# Patient Record
Sex: MALE | Race: WHITE | NOT HISPANIC OR LATINO | Employment: FULL TIME | URBAN - METROPOLITAN AREA
[De-identification: names, ages, dates, MRNs, and addresses within clinical notes are randomized per-mention and may not be internally consistent; named-entity substitution may affect disease eponyms.]

---

## 2022-08-23 ENCOUNTER — MEDICAL CORRESPONDENCE (OUTPATIENT)
Dept: HEALTH INFORMATION MANAGEMENT | Facility: CLINIC | Age: 42
End: 2022-08-23

## 2022-08-25 ENCOUNTER — TRANSCRIBE ORDERS (OUTPATIENT)
Dept: OTHER | Age: 42
End: 2022-08-25

## 2022-08-25 DIAGNOSIS — R42 DIZZINESS: ICD-10-CM

## 2022-08-25 DIAGNOSIS — R40.4 EPISODIC ALTERED AWARENESS: Primary | ICD-10-CM

## 2022-08-25 DIAGNOSIS — R41.3 EPISODE OF MEMORY LOSS: ICD-10-CM

## 2022-11-11 NOTE — TELEPHONE ENCOUNTER
RECORDS RECEIVED FROM: Internal   REASON FOR VISIT: Episodic altered awareness [R40.4]  Episode of memory loss [R41.3]  Dizziness   Date of Appt: 02/02/2023   NOTES (FOR ALL VISITS) STATUS DETAILS   OFFICE NOTE from referring provider Care Everywhere 08/08/2022 [facility]    OFFICE NOTE from other specialist N/A    DISCHARGE SUMMARY from hospital N/A    DISCHARGE REPORT from the ER N/A    OPERATIVE REPORT N/A    MEDICATION LIST N/A    IMAGING  (FOR ALL VISITS)     EMG N/A    EEG N/A    LUMBAR PUNCTURE N/A    AVA SCAN N/A    ULTRASOUND (CAROTID BILAT) *VASCULAR* N/A    MRI (HEAD, NECK, SPINE) Received 06/27/2022 head     CT (HEAD, NECK, SPINE) Received 08/01/2022 head, CTA head neck  06/13/2022 cervical spine, head      Images in PACS

## 2023-02-02 ENCOUNTER — PRE VISIT (OUTPATIENT)
Dept: NEUROLOGY | Facility: CLINIC | Age: 43
End: 2023-02-02

## 2023-02-02 ENCOUNTER — OFFICE VISIT (OUTPATIENT)
Dept: NEUROLOGY | Facility: CLINIC | Age: 43
End: 2023-02-02
Attending: NURSE PRACTITIONER
Payer: COMMERCIAL

## 2023-02-02 VITALS
HEART RATE: 74 BPM | WEIGHT: 285 LBS | SYSTOLIC BLOOD PRESSURE: 127 MMHG | HEIGHT: 77 IN | DIASTOLIC BLOOD PRESSURE: 81 MMHG | BODY MASS INDEX: 33.65 KG/M2

## 2023-02-02 DIAGNOSIS — R41.89 BRAIN FOG: ICD-10-CM

## 2023-02-02 DIAGNOSIS — R40.4 TRANSIENT ALTERATION OF AWARENESS: ICD-10-CM

## 2023-02-02 DIAGNOSIS — G43.011 INTRACTABLE MIGRAINE WITHOUT AURA AND WITH STATUS MIGRAINOSUS: Primary | ICD-10-CM

## 2023-02-02 DIAGNOSIS — G44.83 COUGH HEADACHE: ICD-10-CM

## 2023-02-02 PROCEDURE — 99205 OFFICE O/P NEW HI 60 MIN: CPT | Performed by: STUDENT IN AN ORGANIZED HEALTH CARE EDUCATION/TRAINING PROGRAM

## 2023-02-02 RX ORDER — PREGABALIN 75 MG/1
1 CAPSULE ORAL 2 TIMES DAILY
COMMUNITY
Start: 2022-12-28 | End: 2023-09-18

## 2023-02-02 RX ORDER — SUMATRIPTAN 50 MG/1
50 TABLET, FILM COATED ORAL
Qty: 9 TABLET | Refills: 4 | Status: SHIPPED | OUTPATIENT
Start: 2023-02-02 | End: 2023-06-08

## 2023-02-02 RX ORDER — MELATONIN 10 MG
1 CAPSULE ORAL AT BEDTIME
COMMUNITY

## 2023-02-02 RX ORDER — METHYLPREDNISOLONE 4 MG
TABLET, DOSE PACK ORAL
Qty: 21 TABLET | Refills: 0 | Status: SHIPPED | OUTPATIENT
Start: 2023-02-02 | End: 2023-06-08

## 2023-02-02 RX ORDER — CYCLOBENZAPRINE HCL 10 MG
10 TABLET ORAL 2 TIMES DAILY PRN
Status: ON HOLD | COMMUNITY
Start: 2022-11-25 | End: 2023-06-12

## 2023-02-02 RX ORDER — TOPIRAMATE 25 MG/1
25 TABLET, FILM COATED ORAL 2 TIMES DAILY
Qty: 28 TABLET | Refills: 0 | Status: SHIPPED | OUTPATIENT
Start: 2023-02-02 | End: 2023-03-22

## 2023-02-02 RX ORDER — NICOTINE POLACRILEX 4 MG/1
20 GUM, CHEWING ORAL EVERY MORNING
COMMUNITY
End: 2023-06-08

## 2023-02-02 RX ORDER — TOPIRAMATE 50 MG/1
50 TABLET, FILM COATED ORAL 2 TIMES DAILY
Qty: 60 TABLET | Refills: 3 | Status: SHIPPED | OUTPATIENT
Start: 2023-02-02 | End: 2023-03-22

## 2023-02-02 ASSESSMENT — PAIN SCALES - GENERAL: PAINLEVEL: SEVERE PAIN (7)

## 2023-02-02 NOTE — PATIENT INSTRUCTIONS
Magnesium oxide 400 mg Riboflavin (vitamin b2) 400 mg daily    S.A.D. light, + melatonin (3 mg) are good ideas    Topamax    --Start Topamax 25 mg twice a day x2 week then go up to 50 mg in AM and 50 mg at night x2 weeks then reach out if tolerating can go higher   Most people tolerate topamax well.  We discussed these side effects/considerations including but not limited to: to stay well hydrated (kidney stones a possible side effect), peripheral tingling, brain fog. funny taste, weight loss, rarely glaucoma.  It is also pregnancy category D, and can lead to birth control pills not working. (at high doses, specifically estrogen component).    My next option:  nortriptyline    Imitrex as needed
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
## 2023-02-12 ENCOUNTER — HEALTH MAINTENANCE LETTER (OUTPATIENT)
Age: 43
End: 2023-02-12

## 2023-02-16 ENCOUNTER — TELEPHONE (OUTPATIENT)
Dept: NEUROPSYCHOLOGY | Facility: CLINIC | Age: 43
End: 2023-02-16

## 2023-02-17 NOTE — TELEPHONE ENCOUNTER
[facility] Call Center    Phone Message    May a detailed message be left on voicemail: yes     Reason for Call: Other: Patient was returning a call regarding scheduling. Please call back to discuss.      Action Taken: Other: Neuropsychology     Travel Screening: Not Applicable

## 2023-03-22 DIAGNOSIS — G43.011 INTRACTABLE MIGRAINE WITHOUT AURA AND WITH STATUS MIGRAINOSUS: Primary | ICD-10-CM

## 2023-03-22 RX ORDER — NORTRIPTYLINE HCL 25 MG
25 CAPSULE ORAL AT BEDTIME
Qty: 90 CAPSULE | Refills: 1 | Status: SHIPPED | OUTPATIENT
Start: 2023-03-22 | End: 2024-01-31

## 2023-04-04 NOTE — PROGRESS NOTES
[facility]/[place]  Section of General Neurology  Return Patient  Virtual Visit    Casey Cooper MRN# [id number]   Age: 43 year old YOB: 1980            Assessment and Plan:   Casey Cooper is a pleasant 42 year old man seen in follow up today.  As noted previously has had an insidious worsening over the last year or so with worsening headaches, brain fog and spells for several hours and have amnesia to these events, without clear seizure like activity.  He has been thoroughly worked up in this regard as below to no avail.  It had been speculated that perhaps a chiari was related by some.  To my review his cerebellar tonsil is a tad low but not clearly representative of a symptomatic chiari.  We decided to trial further migrainous type of treatments to see what could be migrainous vs not.  topiramate was helpful but difficult to tolerate.  We switched to nortriptyline in between visits.  Overall his symptoms persist and notably he is getting pressure in his head with neck flexion and episodic incoordination/dizziness where I think we should get further opinion regarding his low hanging cerebellar tonsil as below as symptoms are suspicious that this could be related given how debilitated he is.         --He will decrease lyrica to twice a day and see how he does as unclear how much this is helping  --Increase nortriptyline to 50 mg nightly  --Change imitrex as needed to maxalt as needed for bad migraine days  --Neuropsychological testing previously ordered though he recently completed this closer to home  --Neurosurgery referral to discuss low hanging cerebellar tonsil as it applies to his symptoms, risk/benefit of a potential surgery   --Follow up with me in ~4 months for video visit, reach out sooner with issues questions or changes        Levy Miller MD   of Neurology   [facility]      Interval history:   Had reaction to imitrex--Makes the  "top of head hot but it did work.  Discussed other options.   Steroids--No real change  Topamax--was making him sick, \"dopamax\" type of side effect.  Stopped this.  Intensified hand/feet symptoms.  He suspects it was helping the headaches.    Nortriptyline--no side effects.  Still having headaches.    Neuropsychological testing --scheduled for August, was seen in [facility] in this regard  Has upcoming sleep study.   Notes he always slept poorly.  Balance/dizziness: Still has problems with this.  Still can't really look down, gets pressure in head.    Incoordination--Still present episodically  When headaches were better on topiramate this was perhaps slightly better, not appreciably.    Is still not working.    Is applying for social security disability.      A/P at last visit  Casey Cooper is a pleasant 42 year old man seen in consultation today.  He has had an insidious worsening over the last 8 months with worsening headaches, brain fog and spells where he can not be like himself for several hours and have amnesia to these events, without clear seizure like activity.  He has been thoroughly worked up in this regard as below to no avail.  It had been speculated that perhaps a chiari was related by some.  To my review his cerebellar tonsil is a tad low but not clearly representative of a symptomatic chiari but if symptoms continue without clear resolution I think we could re image in this regard and explore this as an option given other factors noted (incoordination, not clearly present on today's exam).   There are plans for repeating EEG data in [facility] currently as well and should continue to explore these episodes with this neurology team as well. He has tried treatment for primary cough headache with indomethicin with mixed results.   His headache do have some migrainous features as well and discussed that optimizing from this angle could improve brain fog/improve quality of life.  The spells seem related to " headache so perhaps they will improve with improved headache health and improved sleep as well.    Regarding brain fog: Discussed optimizing headaches would be important as is improving sleep, mood if a factor (poor sleep seems to be the biggest feature here).  Will obtain neuropsychological testing to further examine this.  Overall his work up has been throughout without clear explanation.  Will try to chip away at his symptoms and see what we can improve.        Plan:  --Magnesium oxide 400 mg Riboflavin (vitamin b2) 400 mg daily  --S.A.D. light, + melatonin (3 mg) are good ideas for sleep, other sleep hygiene also discussed  --Topiramate to up titrate to 50 mg BID and can increase farther from there pending toleration, side effects discussed  --Next option if this does not help:  Nortriptyline would likely be my recommendation  --Imitrex 50 mg as needed for bad headaches  --Medrol dose pack to try to break headache cycle further, has responded well to steroids in the past  --Neuropsychological testing ordered  --If no improvement with treating as migraine could consider repeating MRI/CINE imaging vs further neurosurgical opinion to better understand significance of low cerebellar tonsil given ambiguity of presentation/some symptoms that sound chiari-like at times.    --Follow up in 2-3 months       Medications:     Current Outpatient Medications   Medication Sig     cyclobenzaprine (FLEXERIL) 10 MG tablet as needed     Melatonin 10 MG CAPS Take by mouth At Bedtime     methylPREDNISolone (MEDROL DOSEPAK) 4 MG tablet therapy pack Follow Package Directions     nortriptyline (PAMELOR) 25 MG capsule Take 1 capsule (25 mg) by mouth At Bedtime     omeprazole 20 MG tablet Take 20 mg by mouth daily     pregabalin (LYRICA) 75 MG capsule Take 1 capsule by mouth 3 times daily     SUMAtriptan (IMITREX) 50 MG tablet Take 1 tablet (50 mg) by mouth at onset of headache for migraine May repeat in 2 hours. Max 4 tablets/24 hours.      No current facility-administered medications for this visit.        Allergies:   No Known Allergies       Physical Exam:   General: Seated comfortably in no acute distress.  Neurologic:     Mental Status: Fully alert, attentive and oriented. Speech clear and fluent, no paraphasic errors.     Cranial Nerves: EOM appear intact. Facial movements symmetric. Hearing not formally tested but intact to conversation.  No dysarthria.     Motor: No tremors or other abnormal movements observed.      Sensory:Not able to be tested virtually     Coordination: Not tested     Gait: Not tested         Data: Pertinent prior to visit   Imaging: CTA H/N 2022  IMPRESSION:   1. No hemodynamically significant intracranial stenosis.   2. The vertebral and internal carotid arteries are patent.   3. The previously identified left cavernous ICA aneurysm is difficult to   Visualize.     I personally reviewed the above imaging and agree with the findings in the report.  Essentially normal/unrevealing          MRI brain 2022  IMPRESSION:   1. Negative brain MRI     Normal brain, slightly low hanging cerebellar tonsil, unclear if meaningful.      MRI c spine 2022     IMPRESSION:   1. No high-grade canal stenosis, cervical cord abnormality, or focal disc bulge.   2. Multilevel foraminal narrowing from uncovertebral hypertrophy as outlined   above, most pronounced C5-6, right C6-7 and right C3-4.     Procedures:  EEG 2022  CLINICAL INTERPRETATION:  No epileptiform activity or focal neuronal dysfunction is present.  Please note a normal study does not entirely exclude the possibility of seizure.  Clinical correlation is recommended      EEG 2023  EEG#:       CLINICAL PROBLEM:  This is a 42-year-old man with blacking out episodes.  Rule out seizures.     This is a sleep-deprived EEG.  This is an extended 41 to 60 minute study which begins on February 9, 2023, at 08:06:19 and ends on February 9, 2023, at 09:03:06.         SUMMARY:  The EEG  shows a 10 to 12 hertz, occipitally dominant, moderate amplitude, well organized, symmetrical alpha rhythm which shows appropriate reactivity to eye opening and closing maneuvers.  Anteriorly, there is a low amplitude, symmetrical beta activity which is obscured at times by muscle artifact in the frontal and temporal regions.  Throughout the recording, no focal, lateralized or epileptiform discharges are noted.     PHOTIC STIMULATION:  Photic driving responses are seen at some of the intermediate flash frequencies.  There are no photoparoxysmal responses.     HYPERVENTILATION:  The patient performs three minutes of good effort hyperventilation.  No abnormalities are induced.         SLEEP:  During the sleep portion of the recording, symmetrical and synchronous sleep spindle activity is seen.  No epileptiform activity is seen.  Some snoring is noted.     The EKG monitor channel shows an irregular rhythm with occasional premature beats.             IMPRESSION:  Normal EEG.  As noted above, there is a slightly irregular cardiac rhythm.  Also as mentioned in the report, the patient does have some snoring raising the possibility of some sleep disordered breathing.       Laboratory:  TSH, Lyme serology, comprehensive metabolic panel, and CBC were all normal previously                      The total time of this encounter today amounted to 23 minutes of time on video visit and 33 minutes in total. This time included time spent with the patient, prep work, ordering tests, and performing post visit documentation.

## 2023-04-05 ENCOUNTER — VIRTUAL VISIT (OUTPATIENT)
Dept: NEUROLOGY | Facility: CLINIC | Age: 43
End: 2023-04-05
Payer: COMMERCIAL

## 2023-04-05 ENCOUNTER — TELEPHONE (OUTPATIENT)
Dept: NEUROSURGERY | Facility: OTHER | Age: 43
End: 2023-04-05

## 2023-04-05 DIAGNOSIS — R41.89 BRAIN FOG: ICD-10-CM

## 2023-04-05 DIAGNOSIS — G43.011 INTRACTABLE MIGRAINE WITHOUT AURA AND WITH STATUS MIGRAINOSUS: Primary | ICD-10-CM

## 2023-04-05 DIAGNOSIS — Q04.8 CEREBELLAR TONSILLAR ECTOPIA (H): ICD-10-CM

## 2023-04-05 DIAGNOSIS — G44.83 COUGH HEADACHE: ICD-10-CM

## 2023-04-05 PROCEDURE — 99214 OFFICE O/P EST MOD 30 MIN: CPT | Mod: VID | Performed by: STUDENT IN AN ORGANIZED HEALTH CARE EDUCATION/TRAINING PROGRAM

## 2023-04-05 RX ORDER — NORTRIPTYLINE HYDROCHLORIDE 50 MG/1
50 CAPSULE ORAL AT BEDTIME
Qty: 90 CAPSULE | Refills: 1 | Status: SHIPPED | OUTPATIENT
Start: 2023-04-05 | End: 2023-06-08

## 2023-04-05 RX ORDER — RIZATRIPTAN BENZOATE 10 MG/1
10 TABLET ORAL
Qty: 9 TABLET | Refills: 4 | Status: SHIPPED | OUTPATIENT
Start: 2023-04-05 | End: 2023-06-08

## 2023-04-05 NOTE — TELEPHONE ENCOUNTER
LMTC- referral from dr. Miller to schedule with Dr. Edmond for Cerebellar tonsillar ectopia.  Recent CT was 8-1-22 1st attempt to schedule from work queue

## 2023-04-05 NOTE — PROGRESS NOTES
Casey is a 43 year old who is being evaluated via a billable video visit.      How would you like to obtain your AVS? MyChart  If the video visit is dropped, the invitation should be resent by: Text to cell phone: [phone]  Will anyone else be joining your video visit? No        Video-Visit Details    Type of service:  Video Visit     Originating Location (pt. Location): Home    Distant Location (provider location):  On-site  Platform [facility] for Video Visit: [facility]   Time of video visit: 11:45-12:08 (23 minutes)  DAVID Valentin, AIDAN ([facility])

## 2023-04-05 NOTE — PATIENT INSTRUCTIONS
Can decrease lyrica to twice a day and see how you do  Increase nortriptyline to 50 mg nightly  Change imitrex as needed to maxalt as needed for bad migraine days  Neurosurgery referral to discuss low hanging cerebellar tonsil as it applies to your symptoms, risk/benefit of surgery  Follow up with me in ~4 months for video visit, reach out sooner with issues questions or changes

## 2023-04-05 NOTE — LETTER
4/5/2023         RE: Casey Cooper  [address] [facility] [address]        Dear Colleague,    Thank you for referring your patient, Casey Cooper, to the [facility] [place]. Please see a copy of my visit note below.    [facility]/[place]  Section of General Neurology  Return Patient  Virtual Visit    Casey Cooper MRN# [id number]   Age: 43 year old YOB: 1980            Assessment and Plan:   Casey Cooper is a pleasant 42 year old man seen in follow up today.  As noted previously has had an insidious worsening over the last year or so with worsening headaches, brain fog and spells for several hours and have amnesia to these events, without clear seizure like activity.  He has been thoroughly worked up in this regard as below to no avail.  It had been speculated that perhaps a chiari was related by some.  To my review his cerebellar tonsil is a tad low but not clearly representative of a symptomatic chiari.  We decided to trial further migrainous type of treatments to see what could be migrainous vs not.  topiramate was helpful but difficult to tolerate.  We switched to nortriptyline in between visits.  Overall his symptoms persist and notably he is getting pressure in his head with neck flexion and episodic incoordination/dizziness where I think we should get further opinion regarding his low hanging cerebellar tonsil as below as symptoms are suspicious that this could be related given how debilitated he is.         --He will decrease lyrica to twice a day and see how he does as unclear how much this is helping  --Increase nortriptyline to 50 mg nightly  --Change imitrex as needed to maxalt as needed for bad migraine days  --Neuropsychological testing previously ordered though he recently completed this closer to home  --Neurosurgery referral to discuss low hanging cerebellar tonsil as it applies to his symptoms, risk/benefit of a potential surgery  "  --Follow up with me in ~4 months for video visit, reach out sooner with issues questions or changes        Levy Miller MD   of Neurology   [facility]      Interval history:   Had reaction to imitrex--Makes the top of head hot but it did work.  Discussed other options.   Steroids--No real change  Topamax--was making him sick, \"dopamax\" type of side effect.  Stopped this.  Intensified hand/feet symptoms.  He suspects it was helping the headaches.    Nortriptyline--no side effects.  Still having headaches.    Neuropsychological testing --scheduled for August, was seen in [facility] in this regard  Has upcoming sleep study.   Notes he always slept poorly.  Balance/dizziness: Still has problems with this.  Still can't really look down, gets pressure in head.    Incoordination--Still present episodically  When headaches were better on topiramate this was perhaps slightly better, not appreciably.    Is still not working.    Is applying for social security disability.      A/P at last visit  Casey Cooper is a pleasant 42 year old man seen in consultation today.  He has had an insidious worsening over the last 8 months with worsening headaches, brain fog and spells where he can not be like himself for several hours and have amnesia to these events, without clear seizure like activity.  He has been thoroughly worked up in this regard as below to no avail.  It had been speculated that perhaps a chiari was related by some.  To my review his cerebellar tonsil is a tad low but not clearly representative of a symptomatic chiari but if symptoms continue without clear resolution I think we could re image in this regard and explore this as an option given other factors noted (incoordination, not clearly present on today's exam).   There are plans for repeating EEG data in [facility] currently as well and should continue to explore these episodes with this neurology team as well. He has " tried treatment for primary cough headache with indomethicin with mixed results.   His headache do have some migrainous features as well and discussed that optimizing from this angle could improve brain fog/improve quality of life.  The spells seem related to headache so perhaps they will improve with improved headache health and improved sleep as well.    Regarding brain fog: Discussed optimizing headaches would be important as is improving sleep, mood if a factor (poor sleep seems to be the biggest feature here).  Will obtain neuropsychological testing to further examine this.  Overall his work up has been throughout without clear explanation.  Will try to chip away at his symptoms and see what we can improve.        Plan:  --Magnesium oxide 400 mg Riboflavin (vitamin b2) 400 mg daily  --S.A.D. light, + melatonin (3 mg) are good ideas for sleep, other sleep hygiene also discussed  --Topiramate to up titrate to 50 mg BID and can increase farther from there pending toleration, side effects discussed  --Next option if this does not help:  Nortriptyline would likely be my recommendation  --Imitrex 50 mg as needed for bad headaches  --Medrol dose pack to try to break headache cycle further, has responded well to steroids in the past  --Neuropsychological testing ordered  --If no improvement with treating as migraine could consider repeating MRI/CINE imaging vs further neurosurgical opinion to better understand significance of low cerebellar tonsil given ambiguity of presentation/some symptoms that sound chiari-like at times.    --Follow up in 2-3 months       Medications:     Current Outpatient Medications   Medication Sig     cyclobenzaprine (FLEXERIL) 10 MG tablet as needed     Melatonin 10 MG CAPS Take by mouth At Bedtime     methylPREDNISolone (MEDROL DOSEPAK) 4 MG tablet therapy pack Follow Package Directions     nortriptyline (PAMELOR) 25 MG capsule Take 1 capsule (25 mg) by mouth At Bedtime     omeprazole 20 MG  tablet Take 20 mg by mouth daily     pregabalin (LYRICA) 75 MG capsule Take 1 capsule by mouth 3 times daily     SUMAtriptan (IMITREX) 50 MG tablet Take 1 tablet (50 mg) by mouth at onset of headache for migraine May repeat in 2 hours. Max 4 tablets/24 hours.     No current facility-administered medications for this visit.        Allergies:   No Known Allergies       Physical Exam:   General: Seated comfortably in no acute distress.  Neurologic:     Mental Status: Fully alert, attentive and oriented. Speech clear and fluent, no paraphasic errors.     Cranial Nerves: EOM appear intact. Facial movements symmetric. Hearing not formally tested but intact to conversation.  No dysarthria.     Motor: No tremors or other abnormal movements observed.      Sensory:Not able to be tested virtually     Coordination: Not tested     Gait: Not tested         Data: Pertinent prior to visit   Imaging: CTA H/N 2022  IMPRESSION:   1. No hemodynamically significant intracranial stenosis.   2. The vertebral and internal carotid arteries are patent.   3. The previously identified left cavernous ICA aneurysm is difficult to   Visualize.     I personally reviewed the above imaging and agree with the findings in the report.  Essentially normal/unrevealing          MRI brain 2022  IMPRESSION:   1. Negative brain MRI     Normal brain, slightly low hanging cerebellar tonsil, unclear if meaningful.      MRI c spine 2022     IMPRESSION:   1. No high-grade canal stenosis, cervical cord abnormality, or focal disc bulge.   2. Multilevel foraminal narrowing from uncovertebral hypertrophy as outlined   above, most pronounced C5-6, right C6-7 and right C3-4.     Procedures:  EEG 2022  CLINICAL INTERPRETATION:  No epileptiform activity or focal neuronal dysfunction is present.  Please note a normal study does not entirely exclude the possibility of seizure.  Clinical correlation is recommended      EEG 2023  EEG#:       CLINICAL PROBLEM:  This is  a 42-year-old man with blacking out episodes.  Rule out seizures.     This is a sleep-deprived EEG.  This is an extended 41 to 60 minute study which begins on February 9, 2023, at 08:06:19 and ends on February 9, 2023, at 09:03:06.         SUMMARY:  The EEG shows a 10 to 12 hertz, occipitally dominant, moderate amplitude, well organized, symmetrical alpha rhythm which shows appropriate reactivity to eye opening and closing maneuvers.  Anteriorly, there is a low amplitude, symmetrical beta activity which is obscured at times by muscle artifact in the frontal and temporal regions.  Throughout the recording, no focal, lateralized or epileptiform discharges are noted.     PHOTIC STIMULATION:  Photic driving responses are seen at some of the intermediate flash frequencies.  There are no photoparoxysmal responses.     HYPERVENTILATION:  The patient performs three minutes of good effort hyperventilation.  No abnormalities are induced.         SLEEP:  During the sleep portion of the recording, symmetrical and synchronous sleep spindle activity is seen.  No epileptiform activity is seen.  Some snoring is noted.     The EKG monitor channel shows an irregular rhythm with occasional premature beats.             IMPRESSION:  Normal EEG.  As noted above, there is a slightly irregular cardiac rhythm.  Also as mentioned in the report, the patient does have some snoring raising the possibility of some sleep disordered breathing.       Laboratory:  TSH, Lyme serology, comprehensive metabolic panel, and CBC were all normal previously                      The total time of this encounter today amounted to 23 minutes of time on video visit and 33 minutes in total. This time included time spent with the patient, prep work, ordering tests, and performing post visit documentation.      Casey is a 43 year old who is being evaluated via a billable video visit.      How would you like to obtain your AVS? MyChart  If the video visit is  dropped, the invitation should be resent by: Text to cell phone: [phone]  Will anyone else be joining your video visit? No        Video-Visit Details    Type of service:  Video Visit     Originating Location (pt. Location): Home    Distant Location (provider location):  On-site  Platform used for Video Visit: Doximity   Time of video visit: 11:45-12:08 (23 minutes)  DAVID Valentin, [facility] ([facility])      Again, thank you for allowing me to participate in the care of your patient.        Sincerely,        Marcin Miller MD

## 2023-04-06 NOTE — TELEPHONE ENCOUNTER
NEUROSURGERY- NEW PREVISIT PLANNING       Record Status/Location     Referring Provider  Dr. Miller   Diagnosis  Cerebellar tonsillar ectopia    MRI (HEAD, NECK, SPINE) In PACs yes   CT In PACs yes   X-ray  no   INJECTION  no   PHYSICAL THERAPY  no   SURGERY  no

## 2023-04-14 DIAGNOSIS — G43.011 INTRACTABLE MIGRAINE WITHOUT AURA AND WITH STATUS MIGRAINOSUS: Primary | ICD-10-CM

## 2023-04-14 RX ORDER — PREDNISONE 50 MG/1
50 TABLET ORAL DAILY
Qty: 5 TABLET | Refills: 0 | Status: SHIPPED | OUTPATIENT
Start: 2023-04-14 | End: 2024-01-31

## 2023-04-14 RX ORDER — METHYLPREDNISOLONE 4 MG
TABLET, DOSE PACK ORAL
Qty: 21 TABLET | Refills: 0 | Status: SHIPPED | OUTPATIENT
Start: 2023-04-14 | End: 2023-04-14

## 2023-04-17 ENCOUNTER — PRE VISIT (OUTPATIENT)
Dept: NEUROSURGERY | Facility: CLINIC | Age: 43
End: 2023-04-17

## 2023-04-17 ENCOUNTER — OFFICE VISIT (OUTPATIENT)
Dept: NEUROSURGERY | Facility: CLINIC | Age: 43
End: 2023-04-17
Payer: COMMERCIAL

## 2023-04-17 VITALS — DIASTOLIC BLOOD PRESSURE: 78 MMHG | SYSTOLIC BLOOD PRESSURE: 128 MMHG | OXYGEN SATURATION: 94 % | HEART RATE: 87 BPM

## 2023-04-17 DIAGNOSIS — G93.5 CHIARI MALFORMATION TYPE I (H): ICD-10-CM

## 2023-04-17 PROCEDURE — 99203 OFFICE O/P NEW LOW 30 MIN: CPT | Performed by: NEUROLOGICAL SURGERY

## 2023-04-17 ASSESSMENT — PAIN SCALES - GENERAL: PAINLEVEL: EXTREME PAIN (9)

## 2023-04-17 NOTE — NURSING NOTE
"Casey Cooper is a 43 year old male who presents for:  Chief Complaint   Patient presents with     Consult        Initial Vitals:  /78   Pulse 87   SpO2 94%  Estimated body mass index is 33.8 kg/m  as calculated from the following:    Height as of 2/2/23: 6' 5\" (1.956 m).    Weight as of 2/2/23: 285 lb (129.3 kg).. There is no height or weight on file to calculate BSA. BP completed using cuff size: large  Extreme Pain (9)    Ramo Mercer    "

## 2023-04-17 NOTE — PROGRESS NOTES
I was asked by Dr. Miller to see this patient in consultation    43 year old male with headaches, Chiari malformation.  10 months of severe, intractable headaches.  Pressure feeling in the convexity, temples, and eyes.  Feels associated numbness in the hands.  Worse with neck flexion.  Extensive work-up by Neurology including injections without improvement.  MR Brain, personally reviewed, with 8 mm Chiari 1 malformation and effacement of cisterna magna.       No past medical history on file.  No past surgical history on file.  Social History     Socioeconomic History     Marital status:      Spouse name: Not on file     Number of children: Not on file     Years of education: Not on file     Highest education level: Not on file   Occupational History     Not on file   Tobacco Use     Smoking status: Former     Types: Cigarettes     Smokeless tobacco: Never   Vaping Use     Vaping status: Not on file   Substance and Sexual Activity     Alcohol use: Not on file     Drug use: Not on file     Sexual activity: Not on file   Other Topics Concern     Not on file   Social History Narrative     Not on file     Social Determinants of Health     Financial Resource Strain: Not on file   Food Insecurity: Not on file   Transportation Needs: Not on file   Physical Activity: Not on file   Stress: Not on file   Social Connections: Not on file   Intimate Partner Violence: Not on file   Housing Stability: Not on file     No family history on file.     ROS: 10 point ROS neg other than the symptoms noted above in the HPI.    Physical Exam  /78   Pulse 87   SpO2 94%   HEENT:  Normocephalic, atraumatic.  PERRLA.  EOM s intact.  Visual fields full to gross exam  Neck:  Supple, non-tender, without lymphadenopathy.  Heart:  No peripheral edema  Lungs:  No SOB  Abdomen:  Non-distended.   Skin:  Warm and dry.  Extremities:  No edema, cyanosis or clubbing.  Psychiatric:  No apparent distress  Musculoskeletal:  Normal bulk and  tone    NEUROLOGICAL EXAMINATION:     Mental status:  Alert and Oriented x 3, speech is fluent.  Cranial nerves:  II-XII intact.   Motor:    Shoulder Abduction:  Right:  5/5   Left:  5/5  Biceps:                      Right:  5/5   Left:  5/5  Triceps:                     Right:  5/5   Left:  5/5  Wrist Extensors:       Right:  5/5   Left:  5/5  Wrist Flexors:           Right:  5/5   Left:  5/5  interosseus :            Right:  5/5   Left:  5/5  Hip Flexion:                Right: 5/5  Left:  5/5  Quadriceps:             Right:  5/5  Left:  5/5  Hamstrings:             Right:  5/5  Left:  5/5  Gastroc Soleus:        Right:  5/5  Left:  5/5  Tib/Ant:                      Right:  5/5  Left:  5/5  EHL:                     Right:  5/5  Left:  5/5  Sensation:  Intact  Reflexes:  Negative Babinski.  Negative Clonus.  Negative Ramirez's.  Coordination:  Smooth finger to nose testing.   Negative pronator drift.  Smooth tandem walking.    A/P:  43 year old male with headaches, Chiari malformation    I had a discussion with the patient, reviewing the history, symptoms, and imaging  Will obtain MR CSF Flow study  Discussed risks and potential benefits of posterior fossa decompression  He will follow up after study to discuss further

## 2023-04-17 NOTE — PATIENT INSTRUCTIONS
Patient Next Steps:    Order placed for imaging.You can schedule at our  today ([place] location only), or Central Scheduling will call you to make the appointment. If you do not hear from them within 1-2 business days you can call the numbers below. We will call you with the results and next steps once imaging is completed.  [phone]    Please call us if you have any further questions or concerns.    [facility]   Phone: [phone]  Fax: [phone]

## 2023-04-17 NOTE — LETTER
4/17/2023         RE: Casey Cooper  [address] [facility] [address]        Dear Colleague,    Thank you for referring your patient, Casey Cooper, to the [facility]. Please see a copy of my visit note below.    I was asked by Dr. Miller to see this patient in consultation    43 year old male with headaches, Chiari malformation.  10 months of severe, intractable headaches.  Pressure feeling in the convexity, temples, and eyes.  Feels associated numbness in the hands.  Worse with neck flexion.  Extensive work-up by Neurology including injections without improvement.  MR Brain, personally reviewed, with 8 mm Chiari 1 malformation and effacement of cisterna magna.       No past medical history on file.  No past surgical history on file.  Social History     Socioeconomic History     Marital status:      Spouse name: Not on file     Number of children: Not on file     Years of education: Not on file     Highest education level: Not on file   Occupational History     Not on file   Tobacco Use     Smoking status: Former     Types: Cigarettes     Smokeless tobacco: Never   Vaping Use     Vaping status: Not on file   Substance and Sexual Activity     Alcohol use: Not on file     Drug use: Not on file     Sexual activity: Not on file   Other Topics Concern     Not on file   Social History Narrative     Not on file     Social Determinants of Health     Financial Resource Strain: Not on file   Food Insecurity: Not on file   Transportation Needs: Not on file   Physical Activity: Not on file   Stress: Not on file   Social Connections: Not on file   Intimate Partner Violence: Not on file   Housing Stability: Not on file     No family history on file.     ROS: 10 point ROS neg other than the symptoms noted above in the HPI.    Physical Exam  /78   Pulse 87   SpO2 94%   HEENT:  Normocephalic, atraumatic.  PERRLA.  EOM s intact.  Visual fields full to gross exam  Neck:   Supple, non-tender, without lymphadenopathy.  Heart:  No peripheral edema  Lungs:  No SOB  Abdomen:  Non-distended.   Skin:  Warm and dry.  Extremities:  No edema, cyanosis or clubbing.  Psychiatric:  No apparent distress  Musculoskeletal:  Normal bulk and tone    NEUROLOGICAL EXAMINATION:     Mental status:  Alert and Oriented x 3, speech is fluent.  Cranial nerves:  II-XII intact.   Motor:    Shoulder Abduction:  Right:  5/5   Left:  5/5  Biceps:                      Right:  5/5   Left:  5/5  Triceps:                     Right:  5/5   Left:  5/5  Wrist Extensors:       Right:  5/5   Left:  5/5  Wrist Flexors:           Right:  5/5   Left:  5/5  interosseus :            Right:  5/5   Left:  5/5  Hip Flexion:                Right: 5/5  Left:  5/5  Quadriceps:             Right:  5/5  Left:  5/5  Hamstrings:             Right:  5/5  Left:  5/5  Gastroc Soleus:        Right:  5/5  Left:  5/5  Tib/Ant:                      Right:  5/5  Left:  5/5  EHL:                     Right:  5/5  Left:  5/5  Sensation:  Intact  Reflexes:  Negative Babinski.  Negative Clonus.  Negative Ramirez's.  Coordination:  Smooth finger to nose testing.   Negative pronator drift.  Smooth tandem walking.    A/P:  43 year old male with headaches, Chiari malformation    I had a discussion with the patient, reviewing the history, symptoms, and imaging  Will obtain MR CSF Flow study  Discussed risks and potential benefits of posterior fossa decompression  He will follow up after study to discuss further         Again, thank you for allowing me to participate in the care of your patient.        Sincerely,        Colton Edmond MD

## 2023-04-18 ENCOUNTER — MYC MEDICAL ADVICE (OUTPATIENT)
Dept: NEUROSURGERY | Facility: CLINIC | Age: 43
End: 2023-04-18

## 2023-04-18 NOTE — CONFIDENTIAL NOTE
Patient saw Dr. Edmond in clinic yesterday for Chiari malformation and headaches. Dr. Edmond recommended MR CSF flow study then follow-up after to discuss surgery further.    Patient sent MyChart with questions. Advised patient we will provide more education once patient meets with Dr. Edmond and there is a case request placed.

## 2023-05-03 ENCOUNTER — MYC MEDICAL ADVICE (OUTPATIENT)
Dept: NEUROSURGERY | Facility: CLINIC | Age: 43
End: 2023-05-03

## 2023-05-04 NOTE — CONFIDENTIAL NOTE
Faxed MRI Order May 4, 2023 to fax number [phone] ([facility] per patient request)    Right Fax confirmed at 10:15 AM     Nancy Guzman RN

## 2023-05-17 ENCOUNTER — DOCUMENTATION ONLY (OUTPATIENT)
Dept: NEUROSURGERY | Facility: CLINIC | Age: 43
End: 2023-05-17

## 2023-05-18 ENCOUNTER — VIRTUAL VISIT (OUTPATIENT)
Dept: NEUROSURGERY | Facility: CLINIC | Age: 43
End: 2023-05-18
Payer: COMMERCIAL

## 2023-05-18 DIAGNOSIS — G93.5 CHIARI MALFORMATION TYPE I (H): Primary | ICD-10-CM

## 2023-05-18 PROCEDURE — 99442 PR PHYSICIAN TELEPHONE EVALUATION 11-20 MIN: CPT | Mod: 93 | Performed by: NEUROLOGICAL SURGERY

## 2023-05-18 NOTE — PROGRESS NOTES
43 year old male with headaches, Chiari malformation.  10 months of severe, intractable headaches.  Pressure feeling in the convexity, temples, and eyes.  Feels associated numbness in the hands.  Worse with neck flexion.  Extensive work-up by Neurology including injections without improvement.  MR Brain, personally reviewed, with 8 mm Chiari 1 malformation and effacement of cisterna magna.    Telephone encounter for follow up.  Worsening headaches with associated nausea and episodic marked memory loss.  MR CSF Flow showed normal flow at foramen magnum.       No past medical history on file.  No past surgical history on file.  Social History     Socioeconomic History     Marital status:      Spouse name: Not on file     Number of children: Not on file     Years of education: Not on file     Highest education level: Not on file   Occupational History     Not on file   Tobacco Use     Smoking status: Former     Types: Cigarettes     Smokeless tobacco: Never   Vaping Use     Vaping status: Not on file   Substance and Sexual Activity     Alcohol use: Not on file     Drug use: Not on file     Sexual activity: Not on file   Other Topics Concern     Not on file   Social History Narrative     Not on file     Social Determinants of Health     Financial Resource Strain: Not on file   Food Insecurity: Not on file   Transportation Needs: Not on file   Physical Activity: Not on file   Stress: Not on file   Social Connections: Not on file   Intimate Partner Violence: Not on file   Housing Stability: Not on file     No family history on file.     ROS: 10 point ROS neg other than the symptoms noted above in the HPI.    Physical Exam  There were no vitals taken for this visit.  HEENT:  Normocephalic, atraumatic.  PERRLA.  EOM s intact.  Visual fields full to gross exam  Neck:  Supple, non-tender, without lymphadenopathy.  Heart:  No peripheral edema  Lungs:  No SOB  Abdomen:  Non-distended.   Skin:  Warm and dry.  Extremities:   No edema, cyanosis or clubbing.  Psychiatric:  No apparent distress  Musculoskeletal:  Normal bulk and tone    NEUROLOGICAL EXAMINATION:     Mental status:  Alert and Oriented x 3, speech is fluent.  Cranial nerves:  II-XII intact.   Motor:    Shoulder Abduction:  Right:  5/5   Left:  5/5  Biceps:                      Right:  5/5   Left:  5/5  Triceps:                     Right:  5/5   Left:  5/5  Wrist Extensors:       Right:  5/5   Left:  5/5  Wrist Flexors:           Right:  5/5   Left:  5/5  interosseus :            Right:  5/5   Left:  5/5  Hip Flexion:                Right: 5/5  Left:  5/5  Quadriceps:             Right:  5/5  Left:  5/5  Hamstrings:             Right:  5/5  Left:  5/5  Gastroc Soleus:        Right:  5/5  Left:  5/5  Tib/Ant:                      Right:  5/5  Left:  5/5  EHL:                     Right:  5/5  Left:  5/5  Sensation:  Intact  Reflexes:  Negative Babinski.  Negative Clonus.  Negative Ramirez's.  Coordination:  Smooth finger to nose testing.   Negative pronator drift.  Smooth tandem walking.    A/P:  43 year old male with headaches, Chiari malformation    Headaches have been worsening  CSF Flow study showed normal flow at foramen magnum  Discussed that some of his symptoms and flow study findings not as supportive of Chiari being symptomatic, although scans do show significant tonsillar depression  He will consider and call us if he wants to schedule posterior fossa decompression    15 minutes telephone discussion  Note: Provider in clinic and patient at home

## 2023-05-18 NOTE — LETTER
5/18/2023         RE: Casey Cooper  [address] [facility] [address]        Dear Colleague,    Thank you for referring your patient, Casey Cooper, to the [facility]. Please see a copy of my visit note below.    43 year old male with headaches, Chiari malformation.  10 months of severe, intractable headaches.  Pressure feeling in the convexity, temples, and eyes.  Feels associated numbness in the hands.  Worse with neck flexion.  Extensive work-up by Neurology including injections without improvement.  MR Brain, personally reviewed, with 8 mm Chiari 1 malformation and effacement of cisterna magna.    Telephone encounter for follow up.  Worsening headaches with associated nausea and episodic marked memory loss.  MR CSF Flow showed normal flow at foramen magnum.       No past medical history on file.  No past surgical history on file.  Social History     Socioeconomic History     Marital status:      Spouse name: Not on file     Number of children: Not on file     Years of education: Not on file     Highest education level: Not on file   Occupational History     Not on file   Tobacco Use     Smoking status: Former     Types: Cigarettes     Smokeless tobacco: Never   Vaping Use     Vaping status: Not on file   Substance and Sexual Activity     Alcohol use: Not on file     Drug use: Not on file     Sexual activity: Not on file   Other Topics Concern     Not on file   Social History Narrative     Not on file     Social Determinants of Health     Financial Resource Strain: Not on file   Food Insecurity: Not on file   Transportation Needs: Not on file   Physical Activity: Not on file   Stress: Not on file   Social Connections: Not on file   Intimate Partner Violence: Not on file   Housing Stability: Not on file     No family history on file.     ROS: 10 point ROS neg other than the symptoms noted above in the HPI.    Physical Exam  There were no vitals taken for this  visit.  HEENT:  Normocephalic, atraumatic.  PERRLA.  EOM s intact.  Visual fields full to gross exam  Neck:  Supple, non-tender, without lymphadenopathy.  Heart:  No peripheral edema  Lungs:  No SOB  Abdomen:  Non-distended.   Skin:  Warm and dry.  Extremities:  No edema, cyanosis or clubbing.  Psychiatric:  No apparent distress  Musculoskeletal:  Normal bulk and tone    NEUROLOGICAL EXAMINATION:     Mental status:  Alert and Oriented x 3, speech is fluent.  Cranial nerves:  II-XII intact.   Motor:    Shoulder Abduction:  Right:  5/5   Left:  5/5  Biceps:                      Right:  5/5   Left:  5/5  Triceps:                     Right:  5/5   Left:  5/5  Wrist Extensors:       Right:  5/5   Left:  5/5  Wrist Flexors:           Right:  5/5   Left:  5/5  interosseus :            Right:  5/5   Left:  5/5  Hip Flexion:                Right: 5/5  Left:  5/5  Quadriceps:             Right:  5/5  Left:  5/5  Hamstrings:             Right:  5/5  Left:  5/5  Gastroc Soleus:        Right:  5/5  Left:  5/5  Tib/Ant:                      Right:  5/5  Left:  5/5  EHL:                     Right:  5/5  Left:  5/5  Sensation:  Intact  Reflexes:  Negative Babinski.  Negative Clonus.  Negative Ramirez's.  Coordination:  Smooth finger to nose testing.   Negative pronator drift.  Smooth tandem walking.    A/P:  43 year old male with headaches, Chiari malformation    Headaches have been worsening  CSF Flow study showed normal flow at foramen magnum  Discussed that some of his symptoms and flow study findings not as supportive of Chiari being symptomatic, although scans do show significant tonsillar depression  He will consider and call us if he wants to schedule posterior fossa decompression      Again, thank you for allowing me to participate in the care of your patient.        Sincerely,        Colton Edmond MD

## 2023-05-25 ENCOUNTER — TELEPHONE (OUTPATIENT)
Dept: NEUROLOGY | Facility: CLINIC | Age: 43
End: 2023-05-25

## 2023-05-25 ENCOUNTER — MYC MEDICAL ADVICE (OUTPATIENT)
Dept: NEUROSURGERY | Facility: CLINIC | Age: 43
End: 2023-05-25

## 2023-05-25 DIAGNOSIS — G93.5 CHIARI MALFORMATION TYPE I (H): Primary | ICD-10-CM

## 2023-05-25 NOTE — TELEPHONE ENCOUNTER
Called in response to patient's mychart message.     He feels his symptoms are worsening.     We discussed the possibility of chiari malformation surgery which is the reason I had him see our colleagues in neurosurgery.     The CSF study being normal adds to diagnostic ambiguity certainly.  We discussed that there is no certainty that a surgery would improve his symptoms but that he has tried and failed so many conventional treatments that I think strongly considering such a surgery would be quite reasonable.  I have seen the surgery be curative and also have no benefit at all but commonly it can improve some symptoms but perhaps not all.     Discussed that I think Dr. Edmond is an excellent doctor and if he is offering him a surgery I would think strongly about doing it given how refractory his symptoms have been and positional nature of symptoms as described to me previously though there is some unfortunate ambiguity as to how helpful it could be.     Levy Miller MD   of Neurology  [facility]

## 2023-05-25 NOTE — TELEPHONE ENCOUNTER
Patient would like to move forward with surgery, routed toDr. Edmond for case request if appropriate.

## 2023-05-26 ENCOUNTER — MYC MEDICAL ADVICE (OUTPATIENT)
Dept: NEUROSURGERY | Facility: CLINIC | Age: 43
End: 2023-05-26

## 2023-06-01 ENCOUNTER — TELEPHONE (OUTPATIENT)
Dept: NEUROSURGERY | Facility: CLINIC | Age: 43
End: 2023-06-01

## 2023-06-01 NOTE — TELEPHONE ENCOUNTER
Patient Instructions    Surgery scheduled at [facility] for Suboccipital craniectomy and Cervical 1 laminectomy with Dr. Edmond    Pre-Operative    Surgical risks: blood clots in the leg or lung, problems urinating, nerve damage, drainage from the incision, infection, stiffness    You will need a Pre-operative physical with primary care physician within 30 days prior to your surgical date.     You will spend 2-4 nights in the hospital.    Shower procedure    You will need to shower the night before and morning of surgery using the antimicrobial soap (chlorhexidine) given to you. Please refer to showering instruction sheet in surgery education folder.    Eating/Drinking    Stop all solid foods 8 hours before surgery.    Keep drinking clear liquids until 4 hours before surgery  o Clear liquids include water, clear juice, black coffee, or clear tea without milk, Gatorade, clear soda.     Medications    Discontinue Aspirin & NSAIDs (Advil/Ibuprofen, Indocin, Naproxen,Nuprin,Relafen/Nabumetone, Diclofenac,Meloxicam, Aleve, Celebrex) 7 days prior to surgical date. After surgery, do not begin taking these medications until given clearance as it may cause bleeding and interfere with healing.    It is ok to take Tylenol (Acetaminophen) for pain within the 7 days prior to surgery. Example: you could take 1000 mg 3 times per day. Do not exceed 3,000 mg per day.     If you are on chronic pain medication (oxycodone, Percocet, hydrocodone, Vicodin, Norco, Dilaudid, morphine, MS Contin, naltrexone, Suboxone, etc) or have a pain contract we will reach out to your pain clinic to gather your most recent records and recommendations for pain management post-op.  Please ask your provider who manages your chronic pain if they require you to schedule an office visit prior to surgery. Continue obtaining your pain medications from your current provider until surgery. Our team will manage your acute post-op pain in  the hospital and during the recovery period. Your pain team will continue to manage your chronic pain.     Any other medications prescribed, please discuss with your primary care provider at your pre-operative physical       Post-Operative    Incision Care    Look at your incision site every day. You  may need a mirror or family member to help you.     Watch for signs of infection  o Redness, swelling, warmth, drainage (Green or yellow drainage (pus) from your incision or increased bloody drainage), and fever of 101 degrees or higher  o Notify clinic [phone]    Remove dressing as instructed upon discharge    Do not apply lotions or ointments to incision    It is okay to shower, just pat the incision dry     No submerging incision in water such as pools, hot tubs, or baths for at least 8 weeks and until the incision is healed    Pain Management  Dealing with pain    As your body heals, you might feel a stabbing, burning, or aching pain. You may also have some numbness.    Everyone feels pain differently, we may ask you to rate your pain using a pain scale. This will let us know how much pain you feel.     Keep in mind that medicine won't take away all of your pain. It helps to try other ways to relax and ease pain.   Things to help with pain    After surgery, we will give you medicine for your pain. These medications work well, but they can make you drowsy, itchy, or sick to your stomach. If we give you narcotics for pain, try to take the pills with food.     For mild to moderate pain, you can take medication such as Tylenol. These can be used with narcotics, but make sure that your narcotic does not contain Tylenol.     Do NOT drive while taking narcotic pain medication    Do NOT drink alcohol while using any pain medication    You can utilize ice as needed (no longer than 20 minutes at one time)  Refills of pain medication: please call the neurosurgery clinic to request 2-3 days before you run out  Aspirin &  NSAIDS (ex. Ibuprofen, aleve, naproxen): Don't take NSAIDs until 2 weeks after surgery to reduce risk of bleeding and interference with bone healing     Bowel Care    Many people have constipation (hard stools) after surgery. The narcotic pain medication we often prescribed can contribute to constipation. To help prevent constipation: Drink plenty of fluid (8-10 glasses/day); Eat more fiber, such as whole grain bread, bran cereal, and fruits and vegetables; Stay active by walking; Over the counter stool softener may also help.      Activity Restrictions    For the first 6 weeks, no lifting > 10 pounds, limited bending, twisting, or overhead reaching.    Take stairs in moderation     Walking is the best way to start exercise after surgery. Take short frequent walks. You may gradually increase the distance as tolerated. If you feel pain, decrease your activity, but DO NOT stop walking. Walking will help you gain strength, prevent muscle soreness and spasms, and prevent blood clots.    Avoid bed rest and prolonged sitting for longer than 30 minutes (change positions frequently while awake)    No contact sports or high impact activities such as; running/jogging, snowmobile or 4 tapia riding or any other recreational vehicles until after given clearance at one of your follow up visits    Contact clinic right away or go to the Emergency Department if you develop:     Infection (incisional redness, swelling, warmth, drainage, or fever (temp > 101 F))    New injury    Bladder or bowel changes or loss of control      Signs of blood clot:  o Swelling and/or warmth in one or both legs  o Pain or tenderness in your leg, ankle, foot, or arm   o Red or discolored skin     Go to the Emergency Department     If sudden onset of severe headache, weakness, confusion, change in level of consciousness, pain, or loss of movement.    Chest pain    Trouble breathing     Post-Op Follow Up Appointments    2 week incision check &  staple/suture removal with a Neurosurgery Nurse    6 week and 3 month post-op visit with Physican Assistant or Nurse Practitioner     Resources    If you are currently employed, you will need to be off work for 2-4 weeks for recovery and healing.    Please fax any FMLA/short term disability paperwork to [phone] prior to surgery    You may call our clinic when you'd like to return to work and we can provide a work letter    To allow staff adequate time to complete paperwork, please send as soon as possible     [facility]  [facility] - [address] [address]  Telephone:  [phone]       Fax:  [phone]

## 2023-06-02 NOTE — TELEPHONE ENCOUNTER
LM with wife per patient request. No answer. Requested patient return call with date/time that works to review pre-op education.    I placed education folder and patient-specific information in the mail for him. Will continue to attempt to connect with patient to review.    Leanna Nolasco RN on 6/2/2023 at 3:22 PM

## 2023-06-02 NOTE — TELEPHONE ENCOUNTER
MASHA for return call or a mychart  asking what time works best.    Leanna Nolasco RN on 6/2/2023 at 11:21 AM

## 2023-06-02 NOTE — TELEPHONE ENCOUNTER
Pre-op scheduled 6/6/23 through [facility].    TELEPHONE CALL.Reviewed pre- and post-operative instructions as outlined in the After Visit Summary/Patient Instructions with patient.   Surgery folder was mailed to patient     Patient Education Topic: Procedure with Dr. Edmond     Learner(s): Patient and Significant other/Spouse     Knowledge Level: Basic    Readiness to Learn: Ready    Method:  Verbal explanation and Written material     Outcome: Able to verbalize instructions    Barriers to Learning: No barrier    Covid Testing: n/a    Scheduling Number: [phone]    NDI/VIBHA: not completed. Sent message to /MA [facility] [employer] to complete.    Pain Clinic: N/A    Patient had the opportunity for questions to be answered. Advised Patient and Significant other/Spouse  to call clinic with any questions/concerns. Gave patient antibacterial soap for pre-surgery skin preparation.     Leanna Nolasco RN on 6/2/2023 at 3:51 PM

## 2023-06-07 ENCOUNTER — TELEPHONE (OUTPATIENT)
Dept: NEUROSURGERY | Facility: OTHER | Age: 43
End: 2023-06-07

## 2023-06-07 NOTE — TELEPHONE ENCOUNTER
"Patient scheduled for Suboccipital craniectomy and Cervical 1 laminectomy for Chiari with Dr. Edmond on 6/9/23.    Calls today asking if this is \"brain surgery\". Reviewed that Dr. Edmond will be performing the suboccipital craniectomy. Patient verbalized understanding and had no further questions at this time.       "

## 2023-06-08 RX ORDER — ONDANSETRON 4 MG/1
4 TABLET, ORALLY DISINTEGRATING ORAL EVERY 8 HOURS PRN
COMMUNITY
End: 2023-06-08

## 2023-06-08 NOTE — PROGRESS NOTES
PTA medications updated by Medication Scribe prior to surgery via phone call with patient (last doses completed by Nurse)     Medication history sources: Patient  In the past week, patient estimated taking medication this percent of the time: Greater than 90%      Significant changes made to the medication list:  Patient reports no longer taking the following meds (med scribe removed from PTA med list): Imitrex 50mg, Maxalt 10mg, Omeprazole 20mg, Medrol dosepak, zofran odt, nortriptyline 50      Additional medication history information:   None    Medication reconciliation completed by provider prior to medication history? No    Time spent in this activity: 30 mins  The information provided in this note is only as accurate as the sources available at the time of update(s)      Prior to Admission medications    Medication Sig Last Dose Taking? Auth Provider Long Term End Date   Melatonin 10 MG CAPS Take 1 capsule by mouth At Bedtime More than a month Yes Reported, Patient     nortriptyline (PAMELOR) 25 MG capsule Take 1 capsule (25 mg) by mouth At Bedtime 6/8/2023 Yes Marcin Miller MD Yes    predniSONE (DELTASONE) 50 MG tablet Take 1 tablet (50 mg) by mouth daily More than a month Yes Marcin Miller MD     pregabalin (LYRICA) 75 MG capsule Take 1 capsule by mouth 2 times daily 6/8/2023 Yes Reported, Patient Yes    cyclobenzaprine (FLEXERIL) 10 MG tablet Take 10 mg by mouth 2 times daily as needed for muscle spasms 6/4/2023  Reported, Patient

## 2023-06-09 ENCOUNTER — ANESTHESIA EVENT (OUTPATIENT)
Dept: SURGERY | Facility: CLINIC | Age: 43
DRG: 027 | End: 2023-06-09
Payer: COMMERCIAL

## 2023-06-09 ENCOUNTER — HOSPITAL ENCOUNTER (INPATIENT)
Facility: CLINIC | Age: 43
LOS: 3 days | Discharge: HOME | DRG: 027 | End: 2023-06-12
Attending: NEUROLOGICAL SURGERY | Admitting: NEUROLOGICAL SURGERY
Payer: COMMERCIAL

## 2023-06-09 ENCOUNTER — ANESTHESIA (OUTPATIENT)
Dept: SURGERY | Facility: CLINIC | Age: 43
DRG: 027 | End: 2023-06-09
Payer: COMMERCIAL

## 2023-06-09 DIAGNOSIS — Z98.890 S/P CRANIOTOMY: Primary | ICD-10-CM

## 2023-06-09 LAB
ABO/RH(D): NORMAL
ANTIBODY SCREEN: NEGATIVE
GLUCOSE SERPL-MCNC: 117 MG/DL (ref 70–99)
SPECIMEN EXPIRATION DATE: NORMAL

## 2023-06-09 PROCEDURE — 61343 CRNEC SOPL CRV LAM DCMPRN: CPT | Mod: AS | Performed by: NURSE PRACTITIONER

## 2023-06-09 PROCEDURE — 272N000001 HC OR GENERAL SUPPLY STERILE: Performed by: NEUROLOGICAL SURGERY

## 2023-06-09 PROCEDURE — 258N000003 HC RX IP 258 OP 636: Performed by: ANESTHESIOLOGY

## 2023-06-09 PROCEDURE — 0NB00ZZ EXCISION OF SKULL, OPEN APPROACH: ICD-10-PCS | Performed by: NEUROLOGICAL SURGERY

## 2023-06-09 PROCEDURE — 250N000011 HC RX IP 250 OP 636: Performed by: NURSE PRACTITIONER

## 2023-06-09 PROCEDURE — 36415 COLL VENOUS BLD VENIPUNCTURE: CPT | Performed by: ANESTHESIOLOGY

## 2023-06-09 PROCEDURE — 250N000011 HC RX IP 250 OP 636: Performed by: NURSE ANESTHETIST, CERTIFIED REGISTERED

## 2023-06-09 PROCEDURE — 250N000009 HC RX 250: Performed by: NURSE ANESTHETIST, CERTIFIED REGISTERED

## 2023-06-09 PROCEDURE — 86850 RBC ANTIBODY SCREEN: CPT | Performed by: ANESTHESIOLOGY

## 2023-06-09 PROCEDURE — 370N000017 HC ANESTHESIA TECHNICAL FEE, PER MIN: Performed by: NEUROLOGICAL SURGERY

## 2023-06-09 PROCEDURE — 250N000013 HC RX MED GY IP 250 OP 250 PS 637: Performed by: NURSE PRACTITIONER

## 2023-06-09 PROCEDURE — 250N000009 HC RX 250: Performed by: NEUROLOGICAL SURGERY

## 2023-06-09 PROCEDURE — 999N000141 HC STATISTIC PRE-PROCEDURE NURSING ASSESSMENT: Performed by: NEUROLOGICAL SURGERY

## 2023-06-09 PROCEDURE — 61343 CRNEC SOPL CRV LAM DCMPRN: CPT | Performed by: NEUROLOGICAL SURGERY

## 2023-06-09 PROCEDURE — 86901 BLOOD TYPING SEROLOGIC RH(D): CPT | Performed by: ANESTHESIOLOGY

## 2023-06-09 PROCEDURE — 272N000004 HC RX 272: Performed by: NEUROLOGICAL SURGERY

## 2023-06-09 PROCEDURE — 258N000003 HC RX IP 258 OP 636: Performed by: NURSE ANESTHETIST, CERTIFIED REGISTERED

## 2023-06-09 PROCEDURE — 360N000079 HC SURGERY LEVEL 6, PER MIN: Performed by: NEUROLOGICAL SURGERY

## 2023-06-09 PROCEDURE — 00NW0ZZ RELEASE CERVICAL SPINAL CORD, OPEN APPROACH: ICD-10-PCS | Performed by: NEUROLOGICAL SURGERY

## 2023-06-09 PROCEDURE — 710N000010 HC RECOVERY PHASE 1, LEVEL 2, PER MIN: Performed by: NEUROLOGICAL SURGERY

## 2023-06-09 PROCEDURE — 250N000011 HC RX IP 250 OP 636: Performed by: ANESTHESIOLOGY

## 2023-06-09 PROCEDURE — 250N000025 HC SEVOFLURANE, PER MIN: Performed by: NEUROLOGICAL SURGERY

## 2023-06-09 PROCEDURE — 200N000001 HC R&B ICU

## 2023-06-09 PROCEDURE — 82947 ASSAY GLUCOSE BLOOD QUANT: CPT | Performed by: ANESTHESIOLOGY

## 2023-06-09 PROCEDURE — 258N000003 HC RX IP 258 OP 636: Performed by: NURSE PRACTITIONER

## 2023-06-09 RX ORDER — BISACODYL 10 MG
10 SUPPOSITORY, RECTAL RECTAL DAILY PRN
Status: DISCONTINUED | OUTPATIENT
Start: 2023-06-09 | End: 2023-06-12 | Stop reason: HOSPADM

## 2023-06-09 RX ORDER — LABETALOL HYDROCHLORIDE 5 MG/ML
10-40 INJECTION, SOLUTION INTRAVENOUS EVERY 10 MIN PRN
Status: DISCONTINUED | OUTPATIENT
Start: 2023-06-09 | End: 2023-06-12 | Stop reason: HOSPADM

## 2023-06-09 RX ORDER — LIDOCAINE HYDROCHLORIDE 20 MG/ML
INJECTION, SOLUTION INFILTRATION; PERINEURAL PRN
Status: DISCONTINUED | OUTPATIENT
Start: 2023-06-09 | End: 2023-06-09

## 2023-06-09 RX ORDER — FENTANYL CITRATE 50 UG/ML
25 INJECTION, SOLUTION INTRAMUSCULAR; INTRAVENOUS EVERY 5 MIN PRN
Status: DISCONTINUED | OUTPATIENT
Start: 2023-06-09 | End: 2023-06-09

## 2023-06-09 RX ORDER — BACITRACIN ZINC 500 [USP'U]/G
OINTMENT TOPICAL PRN
Status: DISCONTINUED | OUTPATIENT
Start: 2023-06-09 | End: 2023-06-09 | Stop reason: HOSPADM

## 2023-06-09 RX ORDER — ACETAMINOPHEN 325 MG/1
650 TABLET ORAL EVERY 4 HOURS PRN
Status: DISCONTINUED | OUTPATIENT
Start: 2023-06-12 | End: 2023-06-12 | Stop reason: HOSPADM

## 2023-06-09 RX ORDER — ESMOLOL HYDROCHLORIDE 10 MG/ML
INJECTION INTRAVENOUS PRN
Status: DISCONTINUED | OUTPATIENT
Start: 2023-06-09 | End: 2023-06-09

## 2023-06-09 RX ORDER — HYDROXYZINE HYDROCHLORIDE 25 MG/1
25 TABLET, FILM COATED ORAL EVERY 6 HOURS PRN
Status: DISCONTINUED | OUTPATIENT
Start: 2023-06-09 | End: 2023-06-12 | Stop reason: HOSPADM

## 2023-06-09 RX ORDER — CEFAZOLIN SODIUM/WATER 3 G/30 ML
3 SYRINGE (ML) INTRAVENOUS
Status: COMPLETED | OUTPATIENT
Start: 2023-06-09 | End: 2023-06-09

## 2023-06-09 RX ORDER — HYDROMORPHONE HCL IN WATER/PF 6 MG/30 ML
0.4 PATIENT CONTROLLED ANALGESIA SYRINGE INTRAVENOUS EVERY 5 MIN PRN
Status: DISCONTINUED | OUTPATIENT
Start: 2023-06-09 | End: 2023-06-09

## 2023-06-09 RX ORDER — FENTANYL CITRATE 50 UG/ML
50 INJECTION, SOLUTION INTRAMUSCULAR; INTRAVENOUS EVERY 5 MIN PRN
Status: DISCONTINUED | OUTPATIENT
Start: 2023-06-09 | End: 2023-06-09

## 2023-06-09 RX ORDER — SODIUM CHLORIDE, SODIUM LACTATE, POTASSIUM CHLORIDE, CALCIUM CHLORIDE 600; 310; 30; 20 MG/100ML; MG/100ML; MG/100ML; MG/100ML
INJECTION, SOLUTION INTRAVENOUS CONTINUOUS
Status: DISCONTINUED | OUTPATIENT
Start: 2023-06-09 | End: 2023-06-09

## 2023-06-09 RX ORDER — ONDANSETRON 2 MG/ML
4 INJECTION INTRAMUSCULAR; INTRAVENOUS EVERY 30 MIN PRN
Status: DISCONTINUED | OUTPATIENT
Start: 2023-06-09 | End: 2023-06-09

## 2023-06-09 RX ORDER — ONDANSETRON 2 MG/ML
4 INJECTION INTRAMUSCULAR; INTRAVENOUS EVERY 6 HOURS PRN
Status: DISCONTINUED | OUTPATIENT
Start: 2023-06-09 | End: 2023-06-12 | Stop reason: HOSPADM

## 2023-06-09 RX ORDER — NALOXONE HYDROCHLORIDE 0.4 MG/ML
0.4 INJECTION, SOLUTION INTRAMUSCULAR; INTRAVENOUS; SUBCUTANEOUS
Status: DISCONTINUED | OUTPATIENT
Start: 2023-06-09 | End: 2023-06-12 | Stop reason: HOSPADM

## 2023-06-09 RX ORDER — SODIUM CHLORIDE, SODIUM LACTATE, POTASSIUM CHLORIDE, CALCIUM CHLORIDE 600; 310; 30; 20 MG/100ML; MG/100ML; MG/100ML; MG/100ML
INJECTION, SOLUTION INTRAVENOUS CONTINUOUS PRN
Status: DISCONTINUED | OUTPATIENT
Start: 2023-06-09 | End: 2023-06-09

## 2023-06-09 RX ORDER — NALOXONE HYDROCHLORIDE 0.4 MG/ML
0.2 INJECTION, SOLUTION INTRAMUSCULAR; INTRAVENOUS; SUBCUTANEOUS
Status: DISCONTINUED | OUTPATIENT
Start: 2023-06-09 | End: 2023-06-12 | Stop reason: HOSPADM

## 2023-06-09 RX ORDER — PREGABALIN 75 MG/1
75 CAPSULE ORAL 2 TIMES DAILY
Status: DISCONTINUED | OUTPATIENT
Start: 2023-06-09 | End: 2023-06-12 | Stop reason: HOSPADM

## 2023-06-09 RX ORDER — CEFAZOLIN SODIUM/WATER 3 G/30 ML
3 SYRINGE (ML) INTRAVENOUS SEE ADMIN INSTRUCTIONS
Status: DISCONTINUED | OUTPATIENT
Start: 2023-06-09 | End: 2023-06-09 | Stop reason: HOSPADM

## 2023-06-09 RX ORDER — ACETAMINOPHEN 325 MG/1
975 TABLET ORAL ONCE
Status: DISCONTINUED | OUTPATIENT
Start: 2023-06-09 | End: 2023-06-09

## 2023-06-09 RX ORDER — HYDRALAZINE HYDROCHLORIDE 20 MG/ML
INJECTION INTRAMUSCULAR; INTRAVENOUS PRN
Status: DISCONTINUED | OUTPATIENT
Start: 2023-06-09 | End: 2023-06-09

## 2023-06-09 RX ORDER — CALCIUM CARBONATE 500 MG/1
500-1000 TABLET, CHEWABLE ORAL 4 TIMES DAILY PRN
Status: DISCONTINUED | OUTPATIENT
Start: 2023-06-09 | End: 2023-06-12 | Stop reason: HOSPADM

## 2023-06-09 RX ORDER — MANNITOL 20 G/100ML
INJECTION, SOLUTION INTRAVENOUS PRN
Status: DISCONTINUED | OUTPATIENT
Start: 2023-06-09 | End: 2023-06-09

## 2023-06-09 RX ORDER — LABETALOL 20 MG/4 ML (5 MG/ML) INTRAVENOUS SYRINGE
PRN
Status: DISCONTINUED | OUTPATIENT
Start: 2023-06-09 | End: 2023-06-09

## 2023-06-09 RX ORDER — ONDANSETRON 2 MG/ML
INJECTION INTRAMUSCULAR; INTRAVENOUS PRN
Status: DISCONTINUED | OUTPATIENT
Start: 2023-06-09 | End: 2023-06-09

## 2023-06-09 RX ORDER — PROPOFOL 10 MG/ML
INJECTION, EMULSION INTRAVENOUS CONTINUOUS PRN
Status: DISCONTINUED | OUTPATIENT
Start: 2023-06-09 | End: 2023-06-09

## 2023-06-09 RX ORDER — HYDROXYZINE HYDROCHLORIDE 25 MG/1
25 TABLET, FILM COATED ORAL EVERY 6 HOURS PRN
Status: DISCONTINUED | OUTPATIENT
Start: 2023-06-09 | End: 2023-06-09

## 2023-06-09 RX ORDER — CEFAZOLIN SODIUM 2 G/100ML
2 INJECTION, SOLUTION INTRAVENOUS EVERY 8 HOURS
Status: COMPLETED | OUTPATIENT
Start: 2023-06-09 | End: 2023-06-10

## 2023-06-09 RX ORDER — NORTRIPTYLINE HCL 25 MG
25 CAPSULE ORAL AT BEDTIME
Status: DISCONTINUED | OUTPATIENT
Start: 2023-06-09 | End: 2023-06-12 | Stop reason: HOSPADM

## 2023-06-09 RX ORDER — PROPOFOL 10 MG/ML
INJECTION, EMULSION INTRAVENOUS PRN
Status: DISCONTINUED | OUTPATIENT
Start: 2023-06-09 | End: 2023-06-09

## 2023-06-09 RX ORDER — CYCLOBENZAPRINE HCL 10 MG
10 TABLET ORAL 3 TIMES DAILY PRN
Status: DISCONTINUED | OUTPATIENT
Start: 2023-06-09 | End: 2023-06-11 | Stop reason: ALTCHOICE

## 2023-06-09 RX ORDER — ONDANSETRON 4 MG/1
4 TABLET, ORALLY DISINTEGRATING ORAL EVERY 30 MIN PRN
Status: DISCONTINUED | OUTPATIENT
Start: 2023-06-09 | End: 2023-06-09

## 2023-06-09 RX ORDER — PROCHLORPERAZINE MALEATE 10 MG
10 TABLET ORAL EVERY 6 HOURS PRN
Status: DISCONTINUED | OUTPATIENT
Start: 2023-06-09 | End: 2023-06-12 | Stop reason: HOSPADM

## 2023-06-09 RX ORDER — ACETAMINOPHEN 325 MG/1
975 TABLET ORAL EVERY 8 HOURS
Status: DISCONTINUED | OUTPATIENT
Start: 2023-06-09 | End: 2023-06-12 | Stop reason: HOSPADM

## 2023-06-09 RX ORDER — ONDANSETRON 4 MG/1
4 TABLET, ORALLY DISINTEGRATING ORAL EVERY 6 HOURS PRN
Status: DISCONTINUED | OUTPATIENT
Start: 2023-06-09 | End: 2023-06-12 | Stop reason: HOSPADM

## 2023-06-09 RX ORDER — HYDROMORPHONE HCL IN WATER/PF 6 MG/30 ML
0.2 PATIENT CONTROLLED ANALGESIA SYRINGE INTRAVENOUS
Status: DISCONTINUED | OUTPATIENT
Start: 2023-06-09 | End: 2023-06-12 | Stop reason: HOSPADM

## 2023-06-09 RX ORDER — OXYCODONE HYDROCHLORIDE 5 MG/1
5 TABLET ORAL EVERY 4 HOURS PRN
Status: DISCONTINUED | OUTPATIENT
Start: 2023-06-09 | End: 2023-06-12 | Stop reason: HOSPADM

## 2023-06-09 RX ORDER — SODIUM CHLORIDE 9 MG/ML
INJECTION, SOLUTION INTRAVENOUS CONTINUOUS
Status: DISCONTINUED | OUTPATIENT
Start: 2023-06-09 | End: 2023-06-10

## 2023-06-09 RX ORDER — CALCIUM CARBONATE 500 MG/1
1000 TABLET, CHEWABLE ORAL 4 TIMES DAILY PRN
Status: DISCONTINUED | OUTPATIENT
Start: 2023-06-09 | End: 2023-06-09

## 2023-06-09 RX ORDER — HYDRALAZINE HYDROCHLORIDE 20 MG/ML
2.5-5 INJECTION INTRAMUSCULAR; INTRAVENOUS EVERY 10 MIN PRN
Status: DISCONTINUED | OUTPATIENT
Start: 2023-06-09 | End: 2023-06-09

## 2023-06-09 RX ORDER — BUPIVACAINE HYDROCHLORIDE AND EPINEPHRINE 5; 5 MG/ML; UG/ML
INJECTION, SOLUTION PERINEURAL PRN
Status: DISCONTINUED | OUTPATIENT
Start: 2023-06-09 | End: 2023-06-09 | Stop reason: HOSPADM

## 2023-06-09 RX ORDER — POLYETHYLENE GLYCOL 3350 17 G/17G
17 POWDER, FOR SOLUTION ORAL DAILY
Status: DISCONTINUED | OUTPATIENT
Start: 2023-06-10 | End: 2023-06-12 | Stop reason: HOSPADM

## 2023-06-09 RX ORDER — VECURONIUM BROMIDE 1 MG/ML
INJECTION, POWDER, LYOPHILIZED, FOR SOLUTION INTRAVENOUS PRN
Status: DISCONTINUED | OUTPATIENT
Start: 2023-06-09 | End: 2023-06-09

## 2023-06-09 RX ORDER — OXYCODONE HYDROCHLORIDE 5 MG/1
10 TABLET ORAL EVERY 4 HOURS PRN
Status: DISCONTINUED | OUTPATIENT
Start: 2023-06-09 | End: 2023-06-12 | Stop reason: HOSPADM

## 2023-06-09 RX ORDER — HYDROMORPHONE HCL IN WATER/PF 6 MG/30 ML
0.2 PATIENT CONTROLLED ANALGESIA SYRINGE INTRAVENOUS EVERY 5 MIN PRN
Status: DISCONTINUED | OUTPATIENT
Start: 2023-06-09 | End: 2023-06-09

## 2023-06-09 RX ORDER — DIMENHYDRINATE 50 MG/ML
25 INJECTION, SOLUTION INTRAMUSCULAR; INTRAVENOUS
Status: DISCONTINUED | OUTPATIENT
Start: 2023-06-09 | End: 2023-06-09

## 2023-06-09 RX ORDER — HYDROMORPHONE HCL IN WATER/PF 6 MG/30 ML
0.4 PATIENT CONTROLLED ANALGESIA SYRINGE INTRAVENOUS
Status: DISCONTINUED | OUTPATIENT
Start: 2023-06-09 | End: 2023-06-12 | Stop reason: HOSPADM

## 2023-06-09 RX ORDER — AMOXICILLIN 250 MG
1 CAPSULE ORAL 2 TIMES DAILY
Status: DISCONTINUED | OUTPATIENT
Start: 2023-06-09 | End: 2023-06-12 | Stop reason: HOSPADM

## 2023-06-09 RX ORDER — LABETALOL HYDROCHLORIDE 5 MG/ML
10 INJECTION, SOLUTION INTRAVENOUS
Status: DISCONTINUED | OUTPATIENT
Start: 2023-06-09 | End: 2023-06-09

## 2023-06-09 RX ORDER — FENTANYL CITRATE 50 UG/ML
INJECTION, SOLUTION INTRAMUSCULAR; INTRAVENOUS PRN
Status: DISCONTINUED | OUTPATIENT
Start: 2023-06-09 | End: 2023-06-09

## 2023-06-09 RX ORDER — SODIUM CHLORIDE, SODIUM LACTATE, POTASSIUM CHLORIDE, CALCIUM CHLORIDE 600; 310; 30; 20 MG/100ML; MG/100ML; MG/100ML; MG/100ML
INJECTION, SOLUTION INTRAVENOUS CONTINUOUS
Status: DISCONTINUED | OUTPATIENT
Start: 2023-06-09 | End: 2023-06-09 | Stop reason: HOSPADM

## 2023-06-09 RX ORDER — LIDOCAINE 40 MG/G
CREAM TOPICAL
Status: DISCONTINUED | OUTPATIENT
Start: 2023-06-09 | End: 2023-06-12 | Stop reason: HOSPADM

## 2023-06-09 RX ORDER — DEXMEDETOMIDINE HYDROCHLORIDE 4 UG/ML
INJECTION, SOLUTION INTRAVENOUS PRN
Status: DISCONTINUED | OUTPATIENT
Start: 2023-06-09 | End: 2023-06-09

## 2023-06-09 RX ORDER — HYDRALAZINE HYDROCHLORIDE 20 MG/ML
10-20 INJECTION INTRAMUSCULAR; INTRAVENOUS EVERY 30 MIN PRN
Status: DISCONTINUED | OUTPATIENT
Start: 2023-06-09 | End: 2023-06-12 | Stop reason: HOSPADM

## 2023-06-09 RX ADMIN — VECURONIUM BROMIDE 4 MG: 1 INJECTION, POWDER, LYOPHILIZED, FOR SOLUTION INTRAVENOUS at 15:05

## 2023-06-09 RX ADMIN — HYDROMORPHONE HYDROCHLORIDE 0.4 MG: 0.2 INJECTION, SOLUTION INTRAMUSCULAR; INTRAVENOUS; SUBCUTANEOUS at 21:31

## 2023-06-09 RX ADMIN — HYDRALAZINE HYDROCHLORIDE 20 MG: 20 INJECTION INTRAMUSCULAR; INTRAVENOUS at 18:12

## 2023-06-09 RX ADMIN — SODIUM CHLORIDE, POTASSIUM CHLORIDE, SODIUM LACTATE AND CALCIUM CHLORIDE: 600; 310; 30; 20 INJECTION, SOLUTION INTRAVENOUS at 14:57

## 2023-06-09 RX ADMIN — VECURONIUM BROMIDE 5 MG: 1 INJECTION, POWDER, LYOPHILIZED, FOR SOLUTION INTRAVENOUS at 13:45

## 2023-06-09 RX ADMIN — CEFAZOLIN SODIUM 2 G: 2 INJECTION, SOLUTION INTRAVENOUS at 21:16

## 2023-06-09 RX ADMIN — HYDROXYZINE HYDROCHLORIDE 25 MG: 25 TABLET, FILM COATED ORAL at 18:40

## 2023-06-09 RX ADMIN — SODIUM CHLORIDE, POTASSIUM CHLORIDE, SODIUM LACTATE AND CALCIUM CHLORIDE: 600; 310; 30; 20 INJECTION, SOLUTION INTRAVENOUS at 10:18

## 2023-06-09 RX ADMIN — HYDRALAZINE HYDROCHLORIDE 5 MG: 20 INJECTION INTRAMUSCULAR; INTRAVENOUS at 13:43

## 2023-06-09 RX ADMIN — CYCLOBENZAPRINE 10 MG: 10 TABLET, FILM COATED ORAL at 21:15

## 2023-06-09 RX ADMIN — MIDAZOLAM 2 MG: 1 INJECTION INTRAMUSCULAR; INTRAVENOUS at 12:37

## 2023-06-09 RX ADMIN — HYDRALAZINE HYDROCHLORIDE 5 MG: 20 INJECTION INTRAMUSCULAR; INTRAVENOUS at 16:12

## 2023-06-09 RX ADMIN — VECURONIUM BROMIDE 4 MG: 1 INJECTION, POWDER, LYOPHILIZED, FOR SOLUTION INTRAVENOUS at 14:30

## 2023-06-09 RX ADMIN — PROPOFOL 50 MG: 10 INJECTION, EMULSION INTRAVENOUS at 12:49

## 2023-06-09 RX ADMIN — OXYCODONE HYDROCHLORIDE 10 MG: 5 TABLET ORAL at 22:07

## 2023-06-09 RX ADMIN — Medication 3 G: at 12:51

## 2023-06-09 RX ADMIN — LABETALOL 20 MG/4 ML (5 MG/ML) INTRAVENOUS SYRINGE 10 MG: at 13:39

## 2023-06-09 RX ADMIN — HYDROCORTISONE SODIUM SUCCINATE 100 MG: 100 INJECTION, POWDER, FOR SOLUTION INTRAMUSCULAR; INTRAVENOUS at 13:30

## 2023-06-09 RX ADMIN — FENTANYL CITRATE 50 MCG: 50 INJECTION, SOLUTION INTRAMUSCULAR; INTRAVENOUS at 16:03

## 2023-06-09 RX ADMIN — ESMOLOL HYDROCHLORIDE 30 MG: 10 INJECTION, SOLUTION INTRAVENOUS at 13:22

## 2023-06-09 RX ADMIN — ONDANSETRON 4 MG: 2 INJECTION INTRAMUSCULAR; INTRAVENOUS at 15:09

## 2023-06-09 RX ADMIN — NORTRIPTYLINE HYDROCHLORIDE 25 MG: 25 CAPSULE ORAL at 21:15

## 2023-06-09 RX ADMIN — OXYCODONE HYDROCHLORIDE 5 MG: 5 TABLET ORAL at 19:54

## 2023-06-09 RX ADMIN — ROCURONIUM BROMIDE 50 MG: 50 INJECTION, SOLUTION INTRAVENOUS at 12:47

## 2023-06-09 RX ADMIN — HYDROMORPHONE HYDROCHLORIDE 0.2 MG: 0.2 INJECTION, SOLUTION INTRAMUSCULAR; INTRAVENOUS; SUBCUTANEOUS at 18:13

## 2023-06-09 RX ADMIN — ACETAMINOPHEN 975 MG: 325 TABLET ORAL at 21:15

## 2023-06-09 RX ADMIN — HYDROMORPHONE HYDROCHLORIDE 0.4 MG: 0.2 INJECTION, SOLUTION INTRAMUSCULAR; INTRAVENOUS; SUBCUTANEOUS at 23:29

## 2023-06-09 RX ADMIN — SUGAMMADEX 200 MG: 100 INJECTION, SOLUTION INTRAVENOUS at 15:43

## 2023-06-09 RX ADMIN — SODIUM CHLORIDE: 9 INJECTION, SOLUTION INTRAVENOUS at 18:16

## 2023-06-09 RX ADMIN — FENTANYL CITRATE 50 MCG: 50 INJECTION, SOLUTION INTRAMUSCULAR; INTRAVENOUS at 13:10

## 2023-06-09 RX ADMIN — PROPOFOL 100 MG: 10 INJECTION, EMULSION INTRAVENOUS at 13:05

## 2023-06-09 RX ADMIN — PROPOFOL 200 MG: 10 INJECTION, EMULSION INTRAVENOUS at 12:47

## 2023-06-09 RX ADMIN — LIDOCAINE HYDROCHLORIDE 100 MG: 20 INJECTION, SOLUTION INFILTRATION; PERINEURAL at 12:47

## 2023-06-09 RX ADMIN — HYDROMORPHONE HYDROCHLORIDE 0.2 MG: 0.2 INJECTION, SOLUTION INTRAMUSCULAR; INTRAVENOUS; SUBCUTANEOUS at 19:56

## 2023-06-09 RX ADMIN — HYDRALAZINE HYDROCHLORIDE 5 MG: 20 INJECTION INTRAMUSCULAR; INTRAVENOUS at 16:27

## 2023-06-09 RX ADMIN — DEXMEDETOMIDINE HYDROCHLORIDE 12 MCG: 200 INJECTION INTRAVENOUS at 13:15

## 2023-06-09 RX ADMIN — FENTANYL CITRATE 50 MCG: 50 INJECTION, SOLUTION INTRAMUSCULAR; INTRAVENOUS at 12:47

## 2023-06-09 RX ADMIN — PROPOFOL 30 MCG/KG/MIN: 10 INJECTION, EMULSION INTRAVENOUS at 13:16

## 2023-06-09 RX ADMIN — PREGABALIN 75 MG: 75 CAPSULE ORAL at 21:15

## 2023-06-09 RX ADMIN — OXYCODONE HYDROCHLORIDE 5 MG: 5 TABLET ORAL at 18:12

## 2023-06-09 RX ADMIN — PROPOFOL 150 MG: 10 INJECTION, EMULSION INTRAVENOUS at 12:54

## 2023-06-09 RX ADMIN — ESMOLOL HYDROCHLORIDE 10 MG: 10 INJECTION, SOLUTION INTRAVENOUS at 15:22

## 2023-06-09 RX ADMIN — HYDROMORPHONE HYDROCHLORIDE 0.4 MG: 0.2 INJECTION, SOLUTION INTRAMUSCULAR; INTRAVENOUS; SUBCUTANEOUS at 16:39

## 2023-06-09 RX ADMIN — PHENYLEPHRINE HYDROCHLORIDE 0.3 MCG/KG/MIN: 10 INJECTION INTRAVENOUS at 13:44

## 2023-06-09 RX ADMIN — HYDROMORPHONE HYDROCHLORIDE 0.5 MG: 1 INJECTION, SOLUTION INTRAMUSCULAR; INTRAVENOUS; SUBCUTANEOUS at 13:41

## 2023-06-09 RX ADMIN — HYDRALAZINE HYDROCHLORIDE 5 MG: 20 INJECTION INTRAMUSCULAR; INTRAVENOUS at 16:55

## 2023-06-09 RX ADMIN — VECURONIUM BROMIDE 5 MG: 1 INJECTION, POWDER, LYOPHILIZED, FOR SOLUTION INTRAVENOUS at 13:03

## 2023-06-09 RX ADMIN — DEXMEDETOMIDINE HYDROCHLORIDE 8 MCG: 200 INJECTION INTRAVENOUS at 13:27

## 2023-06-09 RX ADMIN — FENTANYL CITRATE 50 MCG: 50 INJECTION, SOLUTION INTRAMUSCULAR; INTRAVENOUS at 16:32

## 2023-06-09 RX ADMIN — SODIUM CHLORIDE, POTASSIUM CHLORIDE, SODIUM LACTATE AND CALCIUM CHLORIDE: 600; 310; 30; 20 INJECTION, SOLUTION INTRAVENOUS at 12:55

## 2023-06-09 RX ADMIN — HYDROMORPHONE HYDROCHLORIDE 0.5 MG: 1 INJECTION, SOLUTION INTRAMUSCULAR; INTRAVENOUS; SUBCUTANEOUS at 15:48

## 2023-06-09 RX ADMIN — LABETALOL 20 MG/4 ML (5 MG/ML) INTRAVENOUS SYRINGE 10 MG: at 15:29

## 2023-06-09 RX ADMIN — MANNITOL 30 G: 20 INJECTION, SOLUTION INTRAVENOUS at 13:33

## 2023-06-09 ASSESSMENT — ACTIVITIES OF DAILY LIVING (ADL)
ADLS_ACUITY_SCORE: 18

## 2023-06-09 ASSESSMENT — LIFESTYLE VARIABLES: TOBACCO_USE: 1

## 2023-06-09 NOTE — PROGRESS NOTES
Pt. A&O. HTN with V.O. from CRNA for SBP < 140. Total of 15 mg Hydralazine given. Pain controlled with dilaudid. Neuros intact without deficits. Cervical/neck dressing intact with scan sanguinous drainage. Plan to transfer to ICU with RN to RN handoff. Ok to transfer per Dr. Mcfarlane.

## 2023-06-09 NOTE — ANESTHESIA CARE TRANSFER NOTE
Patient: Casey Cooper    Procedure: Procedure(s):  Suboccipital craniectomy and Cervical 1 laminectomy       Diagnosis: Chiari malformation type I (H) [G93.5]  Diagnosis Additional Information: No value filed.    Anesthesia Type:   General     Note:    Oropharynx: oropharynx clear of all foreign objects and spontaneously breathing  Level of Consciousness: drowsy  Oxygen Supplementation: face mask  Level of Supplemental Oxygen (L/min / FiO2): 6  Independent Airway: airway patency satisfactory and stable  Dentition: dentition unchanged  Vital Signs Stable: post-procedure vital signs reviewed and stable  Report to RN Given: handoff report given  Patient transferred to: PACU  Comments: Neuromuscular blockade reversed with sugammadex, spontaneous respirations, adequate tidal volumes, followed commands to voice, oropharynx suctioned with soft flexible catheter, extubated atraumatically, extubated with suction, airway patent after extubation.  Oxygen via facemask at 6 liters per minute to PACU. Oxygen tubing connected to wall O2 in PACU, SpO2, NiBP, and EKG monitors and alarms on and functioning, report on patient's clinical status given to PACU RN, RN questions answered.     Handoff Report: Identifed the Patient, Identified the Reponsible Provider, Reviewed the pertinent medical history, Discussed the surgical course, Reviewed Intra-OP anesthesia mangement and issues during anesthesia, Set expectations for post-procedure period and Allowed opportunity for questions and acknowledgement of understanding      Vitals:  Vitals Value Taken Time   BP     Temp     Pulse 79 06/09/23 1558   Resp 16 06/09/23 1558   SpO2 96 % 06/09/23 1558   Vitals shown include unvalidated device data.    Electronically Signed By: YUKO Bryson CRNA  June 9, 2023  3:59 PM

## 2023-06-09 NOTE — ANESTHESIA PROCEDURE NOTES
Airway       Patient location during procedure: OR       Procedure Start/Stop Times: 6/9/2023 12:50 PM  Staff -        Anesthesiologist:  Albino Benson MD       CRNA: Hazel Rivers APRN CRNA       Other Anesthesia Staff: Anayeli Sky       Performed By: SRNA  Consent for Airway        Urgency: elective  Indications and Patient Condition       Indications for airway management: rafy-procedural       Induction type:intravenous       Mask difficulty assessment: 2 - vent by mask + OA or adjuvant +/- NMBA    Final Airway Details       Final airway type: endotracheal airway       Successful airway: ETT - single and Oral  Endotracheal Airway Details        ETT size (mm): 8.0       Cuffed: yes       Successful intubation technique: video laryngoscopy       VL Blade Size: Glidescope 4       Grade View of Cords: 1       Adjucts: stylet       Position: Left       Measured from: gums/teeth       Secured at (cm): 23       Bite block used: Soft    Post intubation assessment        Placement verified by: capnometry, equal breath sounds and chest rise        Number of attempts at approach: 1       Number of other approaches attempted: 0       Secured with: pink tape and plastic tape       Ease of procedure: easy       Dentition: Intact and Unchanged    Medication(s) Administered   Medication Administration Time: 6/9/2023 12:50 PM

## 2023-06-09 NOTE — ANESTHESIA PROCEDURE NOTES
Arterial Line Procedure Note    Pre-Procedure   Staff -        Anesthesiologist:  Albino Benson MD       Performed By: anesthesiologist       Location: pre-op       Pre-Anesthestic Checklist: patient identified, IV checked, site marked, risks and benefits discussed, informed consent, monitors and equipment checked, pre-op evaluation and at physician/surgeon's request  Timeout:       Correct Patient: Yes        Correct Procedure: Yes        Correct Site: Yes        Correct Position: Yes   Line Placement:   This line was placed Post Induction  Procedure   Procedure: arterial line       Insertion Site: radial.  Sterile Prep        All elements of maximal sterile barrier technique followed       Patient Prep/Sterile Barriers: hand hygiene, sterile gloves, hat, mask, draped, sterile gown, draped       Skin prep: Chloraprep  Insertion/Injection        Technique: Seldinger Technique and ultrasound guided (No image obtained for permanent record)        1. Ultrasound was used to evaluate the access site.       2. Artery evaluated via ultrasound for patency/adequacy.       3. Using real-time ultrasound the needle/catheter was observed entering the artery/vein.       5. The visualized structures were anatomically normal.       6. There were no apparent abnormal pathologic findings.       Catheter Type/Size: 20 gauge, 1.75 in/4.5 cm quick cath (integral wire)  Narrative        Tegaderm dressing used.       Complications: None apparent,        Arterial waveform: Yes        IBP within 10% of NIBP: Yes

## 2023-06-09 NOTE — BRIEF OP NOTE
[facility] Brief Operative Note    Pre-operative diagnosis: Chiari malformation type I (H) [G93.5]   Post-operative diagnosis As above   Procedure: Procedure(s):  Suboccipital craniectomy and Cervical 1 laminectomy   Surgeon(s): Surgeon(s) and Role:     * Colton Edmond MD - Primary   Estimated blood loss: 100 mL    Specimens: * No specimens in log *   Findings: See op note

## 2023-06-09 NOTE — OP NOTE
DATE OF PROCEDURE: 6/9/23     SURGEON:  Colton Edmond MD      ASSISTANT:  Joyce Torres NP  (Note: The assistant was present for and assisted with the entire surgery and her role as an assistant was crucial for her aid in positioning, exposure, suctioning, retraction and closure)     PREOPERATIVE DIAGNOSIS:  Chiari I malformation.      POSTOPERATIVE DIAGNOSIS:  Chiari I malformation.      PROCEDURES:   1.  Suboccipital craniectomy and C1 laminectomy for posterior fossa decompression.      ESTIMATED BLOOD LOSS:  100 mL.      INDICATIONS FOR PROCEDURE: 43 year old male who presented with intractable, worsening headaches and numbness and tingling in his hands.  MRI of brain and cervical spine demonstrated a Chiari malformation with effacement of the cisterna magna.  He underwent extensive work-up with neurology with no other cause of headaches noted.  We offered posterior fossa decompression.  Risks, benefits, indications, and alternatives were discussed with the patient and family in detail.  All their questions were answered, and they wished to proceed with surgery.      DESCRIPTION OF PROCEDURE:  The patient was positioned prone.  Our sterile prepping and draping procedures were performed.  Antibiotics were administered, timeout was performed.  A midline incision was performed from the inion to C2.  Monopolar was used to perform a dissection exposing the suboccipital bone, C1 arch and C2 spinous process.  A 6 x 3 x 3 cm posterior fossa craniectomy was performed with wide decompression of the foramen magnum.  A C1 laminectomy was also performed in this manner, the dura was completely decompressed.  A partial thickness durotomy was covered in gelfoam and reinforced with DuraSeal glue.  The muscle and fascial layer were closed with 0 Vicryl sutures.  The dermal layer was closed with 2-0 Vicryl sutures and the skin was closed with staples.

## 2023-06-09 NOTE — ANESTHESIA POSTPROCEDURE EVALUATION
Patient: Casey Cooper    Procedure: Procedure(s):  Suboccipital craniectomy and Cervical 1 laminectomy       Anesthesia Type:  General    Note:     Postop Pain Control: Uneventful            Sign Out: Well controlled pain   PONV: No   Neuro/Psych: Uneventful            Sign Out: Acceptable/Baseline neuro status   Airway/Respiratory: Uneventful            Sign Out: Acceptable/Baseline resp. status   CV/Hemodynamics: Uneventful            Sign Out: Acceptable CV status   Other NRE: NONE   DID A NON-ROUTINE EVENT OCCUR?            Last vitals:  Vitals Value Taken Time   /93 06/09/23 1615   Temp     Pulse 76 06/09/23 1621   Resp 10 06/09/23 1621   SpO2 95 % 06/09/23 1621   Vitals shown include unvalidated device data.    Electronically Signed By: Albino Benson MD  June 9, 2023  4:22 PM

## 2023-06-09 NOTE — ANESTHESIA PREPROCEDURE EVALUATION
Anesthesia Pre-Procedure Evaluation    Patient: Casey Cooper   MRN: [id number] : 1980        Procedure : Procedure(s):  Suboccipital craniectomy and Cervical 1 laminectomy          No past medical history on file.   No past surgical history on file.   No Known Allergies   Social History     Tobacco Use     Smoking status: Former     Types: Cigarettes     Smokeless tobacco: Never   Vaping Use     Vaping status: Not on file   Substance Use Topics     Alcohol use: Not on file      Wt Readings from Last 1 Encounters:   23 129.3 kg (285 lb)        Anesthesia Evaluation   Pt has had prior anesthetic. Type: General.    No history of anesthetic complications       ROS/MED HX  ENT/Pulmonary:     (+) sleep apnea, tobacco use, Past use,     Neurologic: Comment: Chiari malformation; aneurysm    (+) migraines,     Cardiovascular:       METS/Exercise Tolerance:     Hematologic:       Musculoskeletal:       GI/Hepatic:       Renal/Genitourinary:       Endo:     (+) Obesity (Class 1),     Psychiatric/Substance Use:       Infectious Disease:       Malignancy:       Other:            Physical Exam    Airway        Mallampati: II   TM distance: > 3 FB   Neck ROM: full   Mouth opening: > 3 cm    Respiratory Devices and Support         Dental       (+) Minor Abnormalities - some fillings, tiny chips      Cardiovascular          Rhythm and rate: regular and normal     Pulmonary           breath sounds clear to auscultation           OUTSIDE LABS:  CBC: No results found for: WBC, HGB, HCT, PLT  BMP: No results found for: NA, POTASSIUM, CHLORIDE, CO2, BUN, CR, GLC  COAGS: No results found for: PTT, INR, FIBR  POC: No results found for: BGM, HCG, HCGS  HEPATIC: No results found for: ALBUMIN, PROTTOTAL, ALT, AST, GGT, ALKPHOS, BILITOTAL, BILIDIRECT, ALBERTO  OTHER: No results found for: PH, LACT, A1C, ANNA MARIE, PHOS, MAG, LIPASE, AMYLASE, TSH, T4, T3, CRP, SED    Anesthesia Plan    ASA Status:  2   NPO Status:  NPO Appropriate     Anesthesia Type: General.     - Airway: ETT   Induction: Intravenous.   Maintenance: Balanced.   Techniques and Equipment:     - Lines/Monitors: 2nd IV, Arterial Line     Consents    Anesthesia Plan(s) and associated risks, benefits, and realistic alternatives discussed. Questions answered and patient/representative(s) expressed understanding.    - Discussed:     - Discussed with:  Patient         Postoperative Care    Pain management: IV analgesics, Oral pain medications, Multi-modal analgesia.   PONV prophylaxis: Ondansetron (or other 5HT-3), Dexamethasone or Solumedrol     Comments:                Albino Benson MD

## 2023-06-10 ENCOUNTER — APPOINTMENT (OUTPATIENT)
Dept: CT IMAGING | Facility: CLINIC | Age: 43
DRG: 027 | End: 2023-06-10
Attending: NURSE PRACTITIONER
Payer: COMMERCIAL

## 2023-06-10 LAB — GLUCOSE BLDC GLUCOMTR-MCNC: 134 MG/DL (ref 70–99)

## 2023-06-10 PROCEDURE — 70450 CT HEAD/BRAIN W/O DYE: CPT

## 2023-06-10 PROCEDURE — 250N000011 HC RX IP 250 OP 636: Performed by: NURSE PRACTITIONER

## 2023-06-10 PROCEDURE — 120N000001 HC R&B MED SURG/OB

## 2023-06-10 PROCEDURE — 250N000013 HC RX MED GY IP 250 OP 250 PS 637: Performed by: NURSE PRACTITIONER

## 2023-06-10 PROCEDURE — 250N000013 HC RX MED GY IP 250 OP 250 PS 637: Performed by: PHYSICIAN ASSISTANT

## 2023-06-10 RX ADMIN — SENNOSIDES AND DOCUSATE SODIUM 1 TABLET: 50; 8.6 TABLET ORAL at 20:25

## 2023-06-10 RX ADMIN — CYCLOBENZAPRINE 10 MG: 10 TABLET, FILM COATED ORAL at 07:31

## 2023-06-10 RX ADMIN — HYDRALAZINE HYDROCHLORIDE 10 MG: 20 INJECTION INTRAMUSCULAR; INTRAVENOUS at 05:40

## 2023-06-10 RX ADMIN — ACETAMINOPHEN 975 MG: 325 TABLET ORAL at 04:50

## 2023-06-10 RX ADMIN — SENNOSIDES AND DOCUSATE SODIUM 1 TABLET: 50; 8.6 TABLET ORAL at 08:30

## 2023-06-10 RX ADMIN — ACETAMINOPHEN 975 MG: 325 TABLET ORAL at 20:24

## 2023-06-10 RX ADMIN — LABETALOL HYDROCHLORIDE 20 MG: 5 INJECTION, SOLUTION INTRAVENOUS at 03:52

## 2023-06-10 RX ADMIN — NORTRIPTYLINE HYDROCHLORIDE 25 MG: 25 CAPSULE ORAL at 22:09

## 2023-06-10 RX ADMIN — CEFAZOLIN SODIUM 2 G: 2 INJECTION, SOLUTION INTRAVENOUS at 12:00

## 2023-06-10 RX ADMIN — OXYCODONE HYDROCHLORIDE 10 MG: 5 TABLET ORAL at 07:28

## 2023-06-10 RX ADMIN — OXYCODONE HYDROCHLORIDE 10 MG: 5 TABLET ORAL at 20:25

## 2023-06-10 RX ADMIN — CYCLOBENZAPRINE 10 MG: 10 TABLET, FILM COATED ORAL at 11:57

## 2023-06-10 RX ADMIN — HYDROMORPHONE HYDROCHLORIDE 0.4 MG: 0.2 INJECTION, SOLUTION INTRAMUSCULAR; INTRAVENOUS; SUBCUTANEOUS at 08:35

## 2023-06-10 RX ADMIN — HYDROMORPHONE HYDROCHLORIDE 0.4 MG: 0.2 INJECTION, SOLUTION INTRAMUSCULAR; INTRAVENOUS; SUBCUTANEOUS at 04:00

## 2023-06-10 RX ADMIN — OXYCODONE HYDROCHLORIDE 10 MG: 5 TABLET ORAL at 02:58

## 2023-06-10 RX ADMIN — LABETALOL HYDROCHLORIDE 10 MG: 5 INJECTION, SOLUTION INTRAVENOUS at 03:12

## 2023-06-10 RX ADMIN — PREGABALIN 75 MG: 75 CAPSULE ORAL at 20:24

## 2023-06-10 RX ADMIN — HYDROMORPHONE HYDROCHLORIDE 0.4 MG: 0.2 INJECTION, SOLUTION INTRAMUSCULAR; INTRAVENOUS; SUBCUTANEOUS at 01:30

## 2023-06-10 RX ADMIN — PREGABALIN 75 MG: 75 CAPSULE ORAL at 08:30

## 2023-06-10 RX ADMIN — CEFAZOLIN SODIUM 2 G: 2 INJECTION, SOLUTION INTRAVENOUS at 04:50

## 2023-06-10 RX ADMIN — HYDROMORPHONE HYDROCHLORIDE 0.4 MG: 0.2 INJECTION, SOLUTION INTRAMUSCULAR; INTRAVENOUS; SUBCUTANEOUS at 06:06

## 2023-06-10 RX ADMIN — HYDROXYZINE HYDROCHLORIDE 25 MG: 25 TABLET, FILM COATED ORAL at 08:30

## 2023-06-10 RX ADMIN — LABETALOL HYDROCHLORIDE 10 MG: 5 INJECTION, SOLUTION INTRAVENOUS at 03:31

## 2023-06-10 RX ADMIN — CYCLOBENZAPRINE 10 MG: 10 TABLET, FILM COATED ORAL at 20:25

## 2023-06-10 RX ADMIN — HYDRALAZINE HYDROCHLORIDE 10 MG: 20 INJECTION INTRAMUSCULAR; INTRAVENOUS at 05:20

## 2023-06-10 RX ADMIN — POLYETHYLENE GLYCOL 3350 17 G: 17 POWDER, FOR SOLUTION ORAL at 08:29

## 2023-06-10 RX ADMIN — ACETAMINOPHEN 975 MG: 325 TABLET ORAL at 12:00

## 2023-06-10 ASSESSMENT — ACTIVITIES OF DAILY LIVING (ADL)
ADLS_ACUITY_SCORE: 21
ADLS_ACUITY_SCORE: 18
ADLS_ACUITY_SCORE: 21

## 2023-06-10 NOTE — PLAN OF CARE
Goal Outcome Evaluation:      Plan of Care Reviewed With: patient, significant other, parent    Reason for Admission: Suboccipital  crani and C1 lami    Cognitive/Mentation: A/Ox4  Neuros/CMS: Intact ex posterior neck pain/stiffness  VS: Stable on RA  Tele: n/a  GI: BS audible, per pt not passing gas yet, minimal appetite  : Oliva discontinued at noon in ICU. Voided at 1700 with minimal PVR  Pulmonary: LS clear  Pain: Reporting     Drains/Lines: PIV SL  Skin: Intact aside from posterior neck incision, covered  Activity: Assist xSB  Diet: Regular/thin    Therapies recs: n/a  Discharge: Pending    Aggression Stoplight Tool: Green    End of shift summary: ICU tx at 1430. Neuros stable. Did not want anything for pain. Discharge pending.

## 2023-06-10 NOTE — PROGRESS NOTES
Patient alert and oriented. Pain to incision site, given dilaudid and oxycodone with minimal relief. Patient c/o persistent itching to his nose - given atarax with some improvement. Partner and mother at bedside this afternoon, partner took his phone and wallet home.

## 2023-06-10 NOTE — PLAN OF CARE
Shift Summary: [id number]- pt transferred to 722. Report called to KAYY Huang prior to transport. Pt chart and belongings sent.    Neuro: intact except pt nose is itchy, improving.   CV: SR/ST. BP within ordered parameter without intervention  Resp: RA.  GI: Dim appetite, tolerating clear liquids and had some yogurt. BS+BM-  : mchugh removed at 1200, pt due to void  Lines/Gtts: R As[place] removed, PIVx2  Family: pts significant other and mother at bedside. Updated by myself.    Lavonne Murray RN on 6/10/2023 at 2:44 PM

## 2023-06-10 NOTE — PLAN OF CARE
Neuro: Intact ex incision pain  Resp: RA  Cardiac: SR. BP elevated 140-160 systolic, 130 with cuff, MD aware and will eval on rounds  : Adequate OP via mchugh.  GI: BS active  Skin: Head and neck incision.    Head CT done this AM.

## 2023-06-10 NOTE — PROGRESS NOTES
"Neurosurgery Progress     Dx:     POD #1 s/p suboccipital craniectomy and cervical 1 laminectomy.    Neuro stable.     TODAY'S PLAN:     -Will transfer out of ICU to 7th floor.  -Liberalized vital and neuro checks to Q4.  -Ordered general diet.  -Discussed full code status.   -Advance activity as tolerated.  -Continue supportive and symptomatic treatment.  -Start physical therapy.  -Pain control measures.  -Advance diet as tolerated.  -Routine wound care.    In the event that patient's symptoms worsen or change we would appreciate being contacted. We did discuss signs of a worsening problem that he should seek being evaluated.     We did review the above information with the patient whom agrees with the plan and did verbalize understanding.   ________________________________________________________________     Mr. Serrano overall feels well and denies any significant discomfort. Tolerating regular diet without n/v.     /86   Pulse 93   Temp 99.1  F (37.3  C) (Oral)   Resp 23   Ht 6' 5\" (1.956 m)   Wt 279 lb 15.8 oz (127 kg)   SpO2 96%   BMI 33.20 kg/m       Patient examined in room 358 with his nurse present. He appears comfortable and in no apparent distress. He is moving all of his extremities well.   CN II-XII intact, alert and appropriate with conversation. Follows all commands.   Bilateral upper and lower extremities with appropriate strength. Deep tendon reflexes within normal limits throughout. Sensation is also intact throughout. He does have an acceptable post-operative range of motion.   His incision is absent for concerning edema, erythema or drainage.    Calves soft and non-tender.      All pertinent labs and updated imaging reviewed in EPIC.     Balaji Montes De Oca PA-C   Neurosurgery   [phone] (P)         "

## 2023-06-10 NOTE — PROVIDER NOTIFICATION
"Page sent to ASHLYN Montes De Oca, \"358 BS: pt and family have questions regarding intra-op findings. Also, can I d/c IVF?\"  -received callback, plan for operative team to update patient on Monday. Also order to discontinue IVF.        "

## 2023-06-11 LAB
CREAT SERPL-MCNC: 1.25 MG/DL (ref 0.67–1.17)
GFR SERPL CREATININE-BSD FRML MDRD: 73 ML/MIN/1.73M2
GLUCOSE BLDC GLUCOMTR-MCNC: 133 MG/DL (ref 70–99)

## 2023-06-11 PROCEDURE — 250N000013 HC RX MED GY IP 250 OP 250 PS 637: Performed by: PHYSICIAN ASSISTANT

## 2023-06-11 PROCEDURE — 82565 ASSAY OF CREATININE: CPT | Performed by: NEUROLOGICAL SURGERY

## 2023-06-11 PROCEDURE — 36415 COLL VENOUS BLD VENIPUNCTURE: CPT | Performed by: NEUROLOGICAL SURGERY

## 2023-06-11 PROCEDURE — 999N000147 HC STATISTIC PT IP EVAL DEFER: Performed by: PHYSICAL THERAPIST

## 2023-06-11 PROCEDURE — 120N000001 HC R&B MED SURG/OB

## 2023-06-11 RX ORDER — DIAZEPAM 5 MG
5 TABLET ORAL EVERY 6 HOURS
Status: DISCONTINUED | OUTPATIENT
Start: 2023-06-11 | End: 2023-06-12 | Stop reason: HOSPADM

## 2023-06-11 RX ADMIN — POLYETHYLENE GLYCOL 3350 17 G: 17 POWDER, FOR SOLUTION ORAL at 09:08

## 2023-06-11 RX ADMIN — NORTRIPTYLINE HYDROCHLORIDE 25 MG: 25 CAPSULE ORAL at 22:34

## 2023-06-11 RX ADMIN — OXYCODONE HYDROCHLORIDE 10 MG: 5 TABLET ORAL at 04:34

## 2023-06-11 RX ADMIN — DIAZEPAM 5 MG: 5 TABLET ORAL at 12:09

## 2023-06-11 RX ADMIN — OXYCODONE HYDROCHLORIDE 10 MG: 5 TABLET ORAL at 09:07

## 2023-06-11 RX ADMIN — CYCLOBENZAPRINE 10 MG: 10 TABLET, FILM COATED ORAL at 04:10

## 2023-06-11 RX ADMIN — SENNOSIDES AND DOCUSATE SODIUM 1 TABLET: 50; 8.6 TABLET ORAL at 20:44

## 2023-06-11 RX ADMIN — PREGABALIN 75 MG: 75 CAPSULE ORAL at 20:43

## 2023-06-11 RX ADMIN — OXYCODONE HYDROCHLORIDE 10 MG: 5 TABLET ORAL at 00:28

## 2023-06-11 RX ADMIN — SENNOSIDES AND DOCUSATE SODIUM 1 TABLET: 50; 8.6 TABLET ORAL at 09:07

## 2023-06-11 RX ADMIN — DIAZEPAM 5 MG: 5 TABLET ORAL at 22:34

## 2023-06-11 RX ADMIN — ACETAMINOPHEN 975 MG: 325 TABLET ORAL at 04:34

## 2023-06-11 RX ADMIN — PREGABALIN 75 MG: 75 CAPSULE ORAL at 09:07

## 2023-06-11 RX ADMIN — ACETAMINOPHEN 975 MG: 325 TABLET ORAL at 20:44

## 2023-06-11 RX ADMIN — DIAZEPAM 5 MG: 5 TABLET ORAL at 17:30

## 2023-06-11 RX ADMIN — OXYCODONE HYDROCHLORIDE 10 MG: 5 TABLET ORAL at 20:44

## 2023-06-11 RX ADMIN — ACETAMINOPHEN 975 MG: 325 TABLET ORAL at 12:09

## 2023-06-11 ASSESSMENT — ACTIVITIES OF DAILY LIVING (ADL)
ADLS_ACUITY_SCORE: 20
ADLS_ACUITY_SCORE: 18
ADLS_ACUITY_SCORE: 20
ADLS_ACUITY_SCORE: 18

## 2023-06-11 NOTE — PLAN OF CARE
Goal Outcome Evaluation:      Plan of Care Reviewed With: patient, spouse, family    Reason for Admission: POD#2 suboccipital  crani and C1 lami     Cognitive/Mentation: A/Ox4  Neuros/CMS: Intact ex posterior neck pain/stiffness  VS: Stable on RA  Tele: n/a  GI: BS audible, passing gas  : Voiding without difficulty, continent  Pulmonary: LS clear  Pain: Posterior neck pain, scheduled tylenol, PRN oxy, Flexeril switched to Valium today and pt reports improved relief from Valium     Drains/Lines: PIVs removed  Skin: Intact aside from posterior neck incision, dressings removed, MARCELLO.   Activity: Up independently  Diet: Regular/thin     Therapies recs: n/a  Discharge: Pending     Aggression Stoplight Tool: Green     End of shift summary: Stable today. Dressing removed, incision MARCELLO without drainage. Showered. Valium started - pt reports improved pain control. Family at the bedside.

## 2023-06-11 NOTE — PLAN OF CARE
OT: Order received, chart reviewed and discussed with care team. OT not indicated due to patient up independently in room. Plans to discharge home with wife. 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 May be more appropriate for OP OT or work hardening program. Defer discharge recommendations to physical therapy and care team. Will complete orders.

## 2023-06-11 NOTE — PROGRESS NOTES
Shift Summary      Neuro: A/Ox4. Cooperative with cares, able to follow commands, and uses his call light for assistance when needed.     Cardiac: Regular apical pulse.     Respiratory: LS clear. Denied shortness of breath.     GI: Continent of bowel and bladder. Denied nausea.     Musculoskeletal: Repositioning in bed independently. Up to the bathroom with standby assist. Have allowed him to be independent in his room this am as he has been steady when up.     Pain: Complaint of posterior neck/incisional pain. PRN pain medications given, see the MAR. Ice packs also applied to aid in comfort. He has been resting quietly between nursing cares.     Skin: No skin concerns.     Incision: Incisional dressing with a scant amount of shadow drainage present.     Lines: PIV to right and left hands.     Discharge: TBD.

## 2023-06-11 NOTE — PLAN OF CARE
PT: Orders received and PT screen completed. Patient is independent with ambulation in hallway with no unsteadiness/LOB noted. Pt navigated 20 stairs with 1 railing independently. Pt reports no concerns with mobility other than a stiff neck after procedure. Encouraged pt to focus on relaxing B shoulders when moving and avoid twisting, bending and lifting. Provided pt with craniectomy activity restriction handout. Discussed with patient recommendation to ask for OP PT/OT working hardening orders as indicated at neurosurgery follow up appointment once activity restrictions are discontinued. Pt in agreement with plan.

## 2023-06-11 NOTE — PROGRESS NOTES
"Neurosurgery Progress     Dx:     POD #2 s/p suboccipital craniectomy and cervical 1 laminectomy.    Neuro stable.     TODAY'S PLAN:     -Advance activity as tolerated.  -Continue supportive and symptomatic treatment.  -Start Occupational and Physical therapy.  -Scheduled valium.  -Pain control measures.  -Advance diet as tolerated.  -Routine wound care.  -Anticipate discharge 06/12.    In the event that patient's symptoms worsen or change we would appreciate being contacted. We did discuss signs of a worsening problem that he should seek being evaluated.     We did review the above information with the patient whom agrees with the plan and did verbalize understanding.   ________________________________________________________________     Mr. Cooper overall feels well and denies any significant discomfort. Neck stiffness. Tolerating regular diet without n/v. Up ambulating independently.      BP (!) 138/93 (BP Location: Left arm)   Pulse 95   Temp 98.5  F (36.9  C) (Oral)   Resp 16   Ht 6' 5\" (1.956 m)   Wt 279 lb 15.8 oz (127 kg)   SpO2 96%   BMI 33.20 kg/m       Pt in bed. Appears comfortable and in no apparent distress, moving all extremities.   CN II-XII intact, alert and appropriate with conversation and following commands.   Bilateral upper and lower extremities with appropriate strength. DTR's WNL. Spine is non tender to palpation throughout. Diminished ROM c-spine. Sensation intact throughout. Acceptable ROM.   Calves soft and non-tender bilaterally.     All pertinent labs and updated imaging reviewed in EPIC.     Balaji Montes De Oca PA-C   Neurosurgery   [phone] (P)       " 120

## 2023-06-11 NOTE — PROVIDER NOTIFICATION
Pt requesting a shower. Call placed to Balaji with neurosurgery, pt is ok for a shower. Also inquired about changing Flexeril to Valium as pt continues to report pretty significant neck stiffness. Orders received.

## 2023-06-12 VITALS
SYSTOLIC BLOOD PRESSURE: 140 MMHG | RESPIRATION RATE: 18 BRPM | HEIGHT: 77 IN | BODY MASS INDEX: 33.06 KG/M2 | TEMPERATURE: 99 F | DIASTOLIC BLOOD PRESSURE: 103 MMHG | HEART RATE: 110 BPM | OXYGEN SATURATION: 100 % | WEIGHT: 279.98 LBS

## 2023-06-12 PROCEDURE — 250N000013 HC RX MED GY IP 250 OP 250 PS 637: Performed by: PHYSICIAN ASSISTANT

## 2023-06-12 RX ORDER — AMOXICILLIN 250 MG
1 CAPSULE ORAL 2 TIMES DAILY PRN
Qty: 20 TABLET | Refills: 0 | Status: SHIPPED | OUTPATIENT
Start: 2023-06-12 | End: 2024-01-31

## 2023-06-12 RX ORDER — ACETAMINOPHEN 325 MG/1
975 TABLET ORAL EVERY 8 HOURS
DISCHARGE
Start: 2023-06-12 | End: 2023-06-15

## 2023-06-12 RX ORDER — OXYCODONE HYDROCHLORIDE 5 MG/1
5-10 TABLET ORAL EVERY 4 HOURS PRN
Qty: 30 TABLET | Refills: 0 | Status: SHIPPED | OUTPATIENT
Start: 2023-06-12 | End: 2023-06-15

## 2023-06-12 RX ORDER — DIAZEPAM 5 MG
5 TABLET ORAL EVERY 6 HOURS
Qty: 40 TABLET | Refills: 0 | Status: SHIPPED | OUTPATIENT
Start: 2023-06-12 | End: 2023-06-15

## 2023-06-12 RX ADMIN — DIAZEPAM 5 MG: 5 TABLET ORAL at 11:00

## 2023-06-12 RX ADMIN — DIAZEPAM 5 MG: 5 TABLET ORAL at 05:26

## 2023-06-12 RX ADMIN — OXYCODONE HYDROCHLORIDE 10 MG: 5 TABLET ORAL at 01:23

## 2023-06-12 RX ADMIN — ACETAMINOPHEN 975 MG: 325 TABLET ORAL at 05:26

## 2023-06-12 RX ADMIN — PREGABALIN 75 MG: 75 CAPSULE ORAL at 08:50

## 2023-06-12 RX ADMIN — OXYCODONE HYDROCHLORIDE 10 MG: 5 TABLET ORAL at 08:50

## 2023-06-12 ASSESSMENT — ACTIVITIES OF DAILY LIVING (ADL)
ADLS_ACUITY_SCORE: 20

## 2023-06-12 NOTE — PLAN OF CARE
Goal Outcome Evaluation:    [id number]    Reason for Admission: POD#3 suboccipital  crani and C1 lami     Cognitive/Mentation: A/Ox4  Neuros/CMS: Intact ex posterior neck pain/stiffness  VS: Stable on RA  Tele: n/a  GI: BS audible, passing gas  : Voiding without difficulty, continent  Pulmonary: LS clear  Pain: Posterior neck pain, scheduled tylenol, PRN oxy, Flexeril switched to Valium today and pt reports improved relief from Valium     Drains/Lines: PIVs removed  Skin: Intact aside from posterior neck incision, dressings removed, MARCELLO.   Activity: Up independently  Diet: Regular/thin     Therapies recs: n/a  Discharge: Pending     Aggression Stoplight Tool: Green     End of shift summary: Stable tonight. Dressing removed, incision MARCELLO without drainage. Valium started.

## 2023-06-12 NOTE — DISCHARGE SUMMARY
[facility]    Discharge Summary  Neurosurgery    Date of Admission:  6/9/2023  Date of Discharge:  6/12/2023 11:47 AM  Discharging Provider: Joyce Torres NP  Date of Service (when I saw the patient): 06/12/23    Discharge Diagnoses   Principal Problem:    S/P craniotomy      History of Present Illness   Casey Cooper is an 43 year old male who presented with intractable, worsening headaches and numbness and tingling in his hands.  MRI of the brain and cervical spine demonstrated a Chiari malformation with effacement of the cisterna magna.  On 6/9/2023 he underwent a suboccipital craniectomy and C1 laminectomy for posterior fossa decompression.  Postoperatively he was doing well.  He passed physical therapy examinations.  Pain was tolerated on an oral regimen.  He was voiding appropriately.  Incision was clean dry and intact.  He was discharged home with routine follow-up.    Hospital Course   Casey Cooper was admitted on 6/9/2023.  The following problems were addressed during his hospitalization:    Principal Problem:    S/P craniotomy    Assessment: as above    Plan: Discharged to home with routine follow up     I have discussed the following assessment and plan with Dr. Edmond who is in agreement with initial plan and will follow up with further consultation recommendations.    Joyce Torres CNP  [facility]  [address] [address]    Tel [phone]  Pager [phone]        Significant Results and Procedures   n/a    Pending Results   These results will be followed up by n/a  Unresulted Labs Ordered in the Past 30 Days of this Admission     No orders found from 5/10/2023 to 6/10/2023.          Code Status   Full Code    Primary Care Physician   Tyrone Gore    Physical Exam   Temp: 99  F (37.2  C) Temp src: Oral BP: (!) 140/103 Pulse: 110   Resp: 18 SpO2: 100 % O2 Device: None (Room air)     Vitals:    06/09/23 1010 06/09/23 1730   Weight: 276 lb (125.2 kg) 279 lb 15.8 oz (127 kg)     Vital Signs with Ranges  Temp:  [97.3  F (36.3  C)-99.6  F (37.6  C)] 99  F (37.2  C)  Pulse:  [] 110  Resp:  [16-18] 18  BP: (125-157)/() 140/103  SpO2:  [92 %-100 %] 100 %  I/O last 3 completed shifts:  In: 200 [P.O.:200]  Out: -     Constitutional: Awake, alert, cooperative, no apparent distress, and appears stated age.  Eyes: Lids and lashes normal, pupils equal, round and reactive to light, extra ocular muscles intact  ENT: Normocephalic, without obvious abnormality, atraumatic  Respiratory: No increased work of breathing  Skin: No bruising or bleeding, normal skin color, texture, turgor, no redness, warmth, or swelling.  Incision with staples intact, minimal drainage, open to air.  Musculoskeletal: There is no redness, warmth, or swelling of the joints.  Full range of motion noted.  Motor strength is 5 out of 5 all extremities bilaterally.  Tone is normal.  Neurologic: Awake, alert, oriented to name, place and time.  Cranial nerves II-XII are grossly intact.  Motor is 5 out of 5 bilaterally.     Neuropsychiatric: Calm, normal eye contact, alert, normal affect, oriented to self, place, time and situation, memory for past and recent events intact and thought process normal.       Time Spent on this Encounter   I, Joyce Torres NP, personally saw the patient today and spent less than or equal to 30 minutes discharging this patient.    Discharge Disposition   Discharged to home  Condition at discharge: Stable    Consultations This Hospital Stay   PHYSICAL THERAPY ADULT IP CONSULT  OCCUPATIONAL THERAPY ADULT IP CONSULT    Discharge Orders      Reason for your hospital stay    Suboccipital craniotomy and C1 laminectomy     Follow-up and recommended labs and tests     Please follow up at [facility] in 2 weeks for a staple removal and in 6 weeks.  Please call the clinic at  [phone] to schedule your appointment.     Activity    Discharge instructions:  No lifting of more than 10 pounds until follow up visit.  Ok to shampoo hair, shower or bathe, but, do not scrub or submerge incision under water until evaluated post operatively in clinic.    Ok to walk as tolerated, avoid bedrest.    No contact sports until after follow up visit  No high impact activities such as; running/jogging, snowmobile or 4 tapia riding or any other recreational vehicles    Call the office at [phone] for increasing redness, swelling or pus draining from the incision, increased pain or any other questions and concerns.    Go to ER with any seizure activity, mental status change (increasing confusion), difficulty with speech or increasing or acute weakness     Wound care and dressings    Keep your incision clean and dry at all times.  OK to remove dressing on postop day 2.  OK to shower on postop day 3 and allow water to run over incision, pat dry after shower.  No bathing swimming or submerging in water.  Call immediately or come to ED if any drainage occurs, or you develop new pain, redness, or swelling.     Diet    Follow this diet upon discharge: home diet     Discharge Medications   Discharge Medication List as of 6/12/2023 11:07 AM      START taking these medications    Details   acetaminophen (TYLENOL) 325 MG tablet Take 3 tablets (975 mg) by mouth every 8 hours, Transitional      diazepam (VALIUM) 5 MG tablet Take 1 tablet (5 mg) by mouth every 6 hours, Disp-40 tablet, R-0, E-Prescribe      oxyCODONE (ROXICODONE) 5 MG tablet Take 1-2 tablets (5-10 mg) by mouth every 4 hours as needed for moderate pain, Disp-30 tablet, R-0, E-Prescribe      senna-docusate (SENOKOT-S/PERICOLACE) 8.6-50 MG tablet Take 1 tablet by mouth 2 times daily as needed for constipation, Disp-20 tablet, R-0, E-Prescribe         CONTINUE these medications which have NOT CHANGED    Details   Melatonin 10 MG CAPS Take 1 capsule  by mouth At Bedtime, Historical      nortriptyline (PAMELOR) 25 MG capsule Take 1 capsule (25 mg) by mouth At Bedtime, Disp-90 capsule, R-1, E-Prescribe      predniSONE (DELTASONE) 50 MG tablet Take 1 tablet (50 mg) by mouth daily, Disp-5 tablet, R-0, E-Prescribe      pregabalin (LYRICA) 75 MG capsule Take 1 capsule by mouth 2 times daily, Historical         STOP taking these medications       cyclobenzaprine (FLEXERIL) 10 MG tablet Comments:   Reason for Stopping:             Allergies   No Known Allergies

## 2023-06-12 NOTE — PLAN OF CARE
Pt is POD3 from suboccipital crani and C1 laminectomy. A&Ox4, VSS on RA. Independent. Regular diet. Neuros intact except neck stiffness/pain. Pain controlled with Tylenol, Oxycodone, Valium. Neck incision with staples MARCELLO WDL. Continent. Voiding well. Pt cleared for discharge. Discharge information and medication provided to pt. Pt verbalized understanding. Pt went home with family.

## 2023-06-15 ENCOUNTER — MYC MEDICAL ADVICE (OUTPATIENT)
Dept: NEUROSURGERY | Facility: CLINIC | Age: 43
End: 2023-06-15

## 2023-06-15 DIAGNOSIS — Z98.890 S/P CRANIOTOMY: ICD-10-CM

## 2023-06-15 RX ORDER — OXYCODONE HYDROCHLORIDE 5 MG/1
5-10 TABLET ORAL EVERY 4 HOURS PRN
Qty: 30 TABLET | Refills: 0 | Status: SHIPPED | OUTPATIENT
Start: 2023-06-15 | End: 2023-06-19

## 2023-06-15 RX ORDER — DIAZEPAM 5 MG
5 TABLET ORAL EVERY 6 HOURS
Qty: 40 TABLET | Refills: 0 | Status: SHIPPED | OUTPATIENT
Start: 2023-06-15 | End: 2023-06-26

## 2023-06-15 RX ORDER — ACETAMINOPHEN 325 MG/1
975 TABLET ORAL EVERY 8 HOURS
Qty: 40 TABLET | Refills: 0 | Status: SHIPPED | OUTPATIENT
Start: 2023-06-15 | End: 2023-06-19

## 2023-06-15 NOTE — TELEPHONE ENCOUNTER
"Last Visit: 6/9/23 Suboccipital craniectomy and Cervical 1 laminectomy    Next Visit: 6/22/23    Name of Provider:  Dr. Edmond    Assessment:     Attempted to reach out to patient, no answer. VM box full.     Placed call to Kacey (c2c on file) -    Pain:    Pt has increase pain when sits up after sleeping in bed. Pressure and throbbing to head. Pt has not had this at any other time. Pt has not tried sleeping in recliner, only in bed.     Had first BM yesterday, straining, felt like \"his brain was falling into body\". Pt was taking stool softener, refused it today. Had severe pain after this, needing to lay afterwards.     No other pain events have occurred for pt    Neuro:  No new s/s    Meds:   - oxycodone: 1-2 q 4  - valium: 1 q 6   - tylenol: PRN    Up and moving around  Recommendation given:     - try sleeping in recliner or more upright, as this pain has only occurred when laying down in bed. If pain occurs despite this, contact us    - encouraged stool softeners, increase water and fiber in diet to decrease straining. If this does not help, contact us.     Reviewed red flags and when to contact us again    Action needed from provider: tanika'd up refills and routed to provider.        "

## 2023-06-19 ENCOUNTER — MYC REFILL (OUTPATIENT)
Dept: NEUROSURGERY | Facility: CLINIC | Age: 43
End: 2023-06-19

## 2023-06-19 ENCOUNTER — MYC MEDICAL ADVICE (OUTPATIENT)
Dept: NEUROSURGERY | Facility: CLINIC | Age: 43
End: 2023-06-19

## 2023-06-19 DIAGNOSIS — Z98.890 S/P CRANIOTOMY: ICD-10-CM

## 2023-06-19 RX ORDER — OXYCODONE HYDROCHLORIDE 5 MG/1
5-10 TABLET ORAL EVERY 4 HOURS PRN
Qty: 30 TABLET | Refills: 0 | Status: SHIPPED | OUTPATIENT
Start: 2023-06-19 | End: 2023-06-26

## 2023-06-19 RX ORDER — ACETAMINOPHEN 325 MG/1
975 TABLET ORAL EVERY 8 HOURS
Qty: 40 TABLET | Refills: 0 | Status: SHIPPED | OUTPATIENT
Start: 2023-06-19 | End: 2024-01-31

## 2023-06-19 NOTE — TELEPHONE ENCOUNTER
Patients PCP will be removing sutures. Visit this week to be changed to telephone as patient is  too uncomfortable to travel.

## 2023-06-19 NOTE — TELEPHONE ENCOUNTER
Patient requesting refill of oxycodone and tylenol.     DOS: 6/9/23   Surgeon:Dr. Edmond  Procedure: Suboccipital craniectomy and Cervical 1 laminectomy  Weeks Post op: 1 week 3 days    Last refilled: oxy 6/15 #30    Pain assessment completed via [employer]. Please see encounter for further details. Refill request will be forwarded to care team.     From separate message:    Casey isn t doing well with surgery recovery. He can barely get out of the recliner to go restroom    They pain is not manageable at the best. He uses ice constantly when he s up. Or going to bed. Only time I don t have him on it is sleeping. He has been pretty miserable. Eating is hard. He has to eat softer foods. Then he can t have bowel movements. I did give him enema that helped. I added fiber supplements to his regimen of pills    They are requesting the oxycodone dose be higher, routed to care team for review.

## 2023-06-20 ENCOUNTER — DOCUMENTATION ONLY (OUTPATIENT)
Dept: OTHER | Facility: CLINIC | Age: 43
End: 2023-06-20

## 2023-06-22 ENCOUNTER — VIRTUAL VISIT (OUTPATIENT)
Dept: NEUROSURGERY | Facility: CLINIC | Age: 43
End: 2023-06-22
Payer: COMMERCIAL

## 2023-06-22 DIAGNOSIS — Z98.890 S/P CRANIOTOMY: Primary | ICD-10-CM

## 2023-06-22 DIAGNOSIS — G93.5 CHIARI MALFORMATION TYPE I (H): ICD-10-CM

## 2023-06-22 PROCEDURE — 99024 POSTOP FOLLOW-UP VISIT: CPT | Mod: 95 | Performed by: NURSE PRACTITIONER

## 2023-06-22 NOTE — NURSING NOTE
"Casey Cooper is a 43 year old male who presents for:  Chief Complaint   Patient presents with     RECHECK     Pain is averaging at a seven, symptoms peak in morning, dizziness and pressure in head, feeling like eyes are struggling to adjust to light,  (L) scalp numbness, not experiencing numbness in arms or legs        Initial Vitals:  There were no vitals taken for this visit. Estimated body mass index is 33.2 kg/m  as calculated from the following:    Height as of 6/9/23: 6' 5\" (1.956 m).    Weight as of 6/9/23: 279 lb 15.8 oz (127 kg).. There is no height or weight on file to calculate BSA. BP completed using cuff size: NA (Not Taken)  Data Unavailable    Nursing Comments:       Carolny Sneed    "

## 2023-06-22 NOTE — PROGRESS NOTES
"The patient has been notified of following:     \"This telephone visit will be conducted via a call between you and your physician/provider. We have found that certain health care needs can be provided without the need for a physical exam.  This service lets us provide the care you need with a short phone conversation.  If a prescription is necessary we can send it directly to your pharmacy.  If lab work is needed we can place an order for that and you can then stop by our lab to have the test done at a later time.    If during the course of the call the physician/provider feels a telephone visit is not appropriate, you will not be charged for this service.\"     Physician/provider has received verbal consent for a Telephone Visit from the patient? Yes     [facility]  Virtual Visit       HPI: 2 weeks s/p suboccipital craniectomy and C1 laminectomy for posterior fossa decompression. Patient reports pre op symptoms are slowly improving, specifically decreased pain with neck flexion and decreased headaches. His pain is mostly located along the incision area, managed with ice packs, tylenol, oxycodone, and valium PRN. Patient would like to start weaning off pain medications in the next 1-2 weeks. Patient saw PCP this morning for incision check. Denies any drainage, dehiscence, or other incision concerns. PCP plans to remove staples this afternoon if our clinic approves.     Current pain: 6    ROS: 10 point ROS neg other than the symptoms noted above in the HPI.    Assessment/Plan:  2 weeks s/p suboccipital craniectomy and C1 laminectomy for posterior fossa decompression.    - Patient sent photo of incision via [employer]. Incision is healing well. No signs of dehiscence or drainage. Patient plans to have PCP remove staples today since this is closer to home.   - Continue with post operative care plan   - No lifting more than 10 lbs   - Follow up at 6 weeks post op as scheduled   - Call our clinic " for any incisional concerns, increased pain, new symptoms, or any other post operative questions or concerns    Discussed red flag symptoms and advised to seek medical attention if these develop. Patient voiced understanding and agreement.      Phone call duration: 14 minutes      Shanta Jones [facility]  [address] [address]  Tel [phone]  Pager [phone]

## 2023-06-22 NOTE — LETTER
"    6/22/2023         RE: Casey Cooper  [address] [facility] [address]        Dear Colleague,    Thank you for referring your patient, Casey Cooper, to the [facility]. Please see a copy of my visit note below.    The patient has been notified of following:     \"This telephone visit will be conducted via a call between you and your physician/provider. We have found that certain health care needs can be provided without the need for a physical exam.  This service lets us provide the care you need with a short phone conversation.  If a prescription is necessary we can send it directly to your pharmacy.  If lab work is needed we can place an order for that and you can then stop by our lab to have the test done at a later time.    If during the course of the call the physician/provider feels a telephone visit is not appropriate, you will not be charged for this service.\"     Physician/provider has received verbal consent for a Telephone Visit from the patient? Yes     [facility]  Virtual Visit       HPI: 2 weeks s/p suboccipital craniectomy and C1 laminectomy for posterior fossa decompression. Patient reports pre op symptoms are slowly improving, specifically decreased pain with neck flexion and decreased headaches. His pain is mostly located along the incision area, managed with ice packs, tylenol, oxycodone, and valium PRN. Patient would like to start weaning off pain medications in the next 1-2 weeks. Patient saw PCP this morning for incision check. Denies any drainage, dehiscence, or other incision concerns. PCP plans to remove staples this afternoon if our clinic approves.     Current pain: 6    ROS: 10 point ROS neg other than the symptoms noted above in the HPI.    Assessment/Plan:  2 weeks s/p suboccipital craniectomy and C1 laminectomy for posterior fossa decompression.    - Patient sent photo of incision via [employer]. Incision is healing " well. No signs of dehiscence or drainage. Patient plans to have PCP remove staples today since this is closer to home.   - Continue with post operative care plan   - No lifting more than 10 lbs   - Follow up at 6 weeks post op as scheduled   - Call our clinic for any incisional concerns, increased pain, new symptoms, or any other post operative questions or concerns    Discussed red flag symptoms and advised to seek medical attention if these develop. Patient voiced understanding and agreement.      Phone call duration: 14 minutes      Shanta Jones CNP  [facility] [place] [address]  Tel [phone]  Pager [phone]            Again, thank you for allowing me to participate in the care of your patient.        Sincerely,        Shanta Jones NP

## 2023-06-26 ENCOUNTER — MYC REFILL (OUTPATIENT)
Dept: NEUROSURGERY | Facility: CLINIC | Age: 43
End: 2023-06-26

## 2023-06-26 DIAGNOSIS — Z98.890 S/P CRANIOTOMY: ICD-10-CM

## 2023-06-27 RX ORDER — DIAZEPAM 5 MG
5 TABLET ORAL EVERY 6 HOURS
Qty: 40 TABLET | Refills: 0 | Status: SHIPPED | OUTPATIENT
Start: 2023-06-27 | End: 2024-06-06

## 2023-06-27 RX ORDER — OXYCODONE HYDROCHLORIDE 5 MG/1
5-10 TABLET ORAL EVERY 4 HOURS PRN
Qty: 30 TABLET | Refills: 0 | Status: SHIPPED | OUTPATIENT
Start: 2023-06-27 | End: 2024-01-31

## 2023-06-27 NOTE — CONFIDENTIAL NOTE
Pain assessment completed in separate encounter.     DOS: 6/9/23   Surgeon: Dr. Edmond  Procedure: Suboccipital craniectomy and Cervical 1 laminectomy  Weeks Post op: 2 weeks 4 days  Last refilled: 6/19 oxycodone #30 valium #40    Encounter routed to care team.

## 2023-07-27 ENCOUNTER — TELEPHONE (OUTPATIENT)
Dept: NEUROPSYCHOLOGY | Facility: CLINIC | Age: 43
End: 2023-07-27

## 2023-07-27 NOTE — TELEPHONE ENCOUNTER
Patient cancelled Neuropsych Eval via MyChart. Called to see if he would like to reschedule. ANISHA, sent MyC.    Timmy East on 7/27/2023 at 5:38 PM

## 2023-07-31 ENCOUNTER — MYC MEDICAL ADVICE (OUTPATIENT)
Dept: NEUROSURGERY | Facility: CLINIC | Age: 43
End: 2023-07-31

## 2023-07-31 DIAGNOSIS — Z98.890 STATUS POST CRANIECTOMY: Primary | ICD-10-CM

## 2023-07-31 DIAGNOSIS — Z98.890 HX OF EXCISION OF LAMINA OF CERVICAL VERTEBRA FOR DECOMPRESSION OF SPINAL CORD: ICD-10-CM

## 2023-08-01 NOTE — TELEPHONE ENCOUNTER
PT order placed and faxed to [facility] at Fax number [phone].  Confirmed 8/1/23 at 216pm

## 2023-08-01 NOTE — TELEPHONE ENCOUNTER
Patient s/p Suboccipital craniectomy and C1 laminectomy for posterior fossa decompression on 6/7/23. As per Dr Edmond, ok for patient start advancing activity as tolerated and start PT.
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
## 2023-08-23 ENCOUNTER — VIRTUAL VISIT (OUTPATIENT)
Dept: NEUROLOGY | Facility: CLINIC | Age: 43
End: 2023-08-23

## 2023-08-23 DIAGNOSIS — Q04.8 CEREBELLAR TONSILLAR ECTOPIA (H): ICD-10-CM

## 2023-08-23 DIAGNOSIS — Z98.890 S/P CRANIOTOMY: ICD-10-CM

## 2023-08-23 DIAGNOSIS — G43.011 INTRACTABLE MIGRAINE WITHOUT AURA AND WITH STATUS MIGRAINOSUS: Primary | ICD-10-CM

## 2023-08-23 DIAGNOSIS — R42 DIZZINESS: ICD-10-CM

## 2023-08-23 DIAGNOSIS — R41.89 BRAIN FOG: ICD-10-CM

## 2023-08-23 PROCEDURE — 99215 OFFICE O/P EST HI 40 MIN: CPT | Mod: 95 | Performed by: STUDENT IN AN ORGANIZED HEALTH CARE EDUCATION/TRAINING PROGRAM

## 2023-08-23 RX ORDER — MECLIZINE HYDROCHLORIDE 25 MG/1
25 TABLET ORAL 3 TIMES DAILY PRN
Qty: 90 TABLET | Refills: 3 | Status: SHIPPED | OUTPATIENT
Start: 2023-08-23 | End: 2024-01-31
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
## 2023-08-23 NOTE — PROGRESS NOTES
Casey is a 43 year old who is being evaluated via a billable video visit.      How would you like to obtain your AVS? Ofelia  If the video visit is dropped, the invitation should be resent by: ofelia  Will anyone else be joining your video visit? No.        Video-Visit Details    Type of service:  Video Visit     Originating Location (pt. Location): Home    Distant Location (provider location):  On-site  Platform used for Video Visit: Ashleigh

## 2023-08-23 NOTE — PATIENT INSTRUCTIONS
Meclizine PRN  Continue PT  Continue to see what improves what doesn't  Recommend repeating sleep apnea testing that is most actionable way to improve memory  Can repeat neuropsychological testing pending how things are going  See if work has a less physically intense role for you,  might be a challenge to return in same role but to be determined.

## 2023-08-31 ENCOUNTER — OFFICE VISIT (OUTPATIENT)
Dept: NEUROSURGERY | Facility: CLINIC | Age: 43
End: 2023-08-31
Payer: MEDICAID

## 2023-08-31 VITALS — OXYGEN SATURATION: 96 % | SYSTOLIC BLOOD PRESSURE: 158 MMHG | DIASTOLIC BLOOD PRESSURE: 92 MMHG | HEART RATE: 77 BPM

## 2023-08-31 DIAGNOSIS — Z98.890 S/P CRANIOTOMY: Primary | ICD-10-CM

## 2023-08-31 PROCEDURE — 99024 POSTOP FOLLOW-UP VISIT: CPT | Performed by: NEUROLOGICAL SURGERY

## 2023-08-31 ASSESSMENT — PAIN SCALES - GENERAL: PAINLEVEL: EXTREME PAIN (8)

## 2023-08-31 NOTE — PATIENT INSTRUCTIONS
Follow-up in 6 weeks with NICHOLAS   Quinolones Counseling:  I discussed with the patient the risks of fluoroquinolones including but not limited to GI upset, allergic reaction, drug rash, diarrhea, dizziness, photosensitivity, yeast infections, liver function test abnormalities, tendonitis/tendon rupture.

## 2023-08-31 NOTE — NURSING NOTE
"Casey Cooper is a 43 year old male who presents for:  Chief Complaint   Patient presents with    RECHECK     6-10wk follow-up suboccipital craniotomy and C1 laminectomy, both feet have burning with cold feel on bottoms, hands have trouble making fine tune movements         Initial Vitals:  BP (!) 158/92   Pulse 77   SpO2 96%  Estimated body mass index is 33.2 kg/m  as calculated from the following:    Height as of 6/9/23: 6' 5\" (1.956 m).    Weight as of 6/9/23: 279 lb 15.8 oz (127 kg).. There is no height or weight on file to calculate BSA. BP completed using cuff size: regular  Extreme Pain (8)    Nursing Comments:       Carolyn Sneed    "
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
## 2023-09-18 ENCOUNTER — MYC MEDICAL ADVICE (OUTPATIENT)
Dept: NEUROLOGY | Facility: CLINIC | Age: 43
End: 2023-09-18
Payer: MEDICAID

## 2023-09-18 DIAGNOSIS — G43.011 INTRACTABLE MIGRAINE WITHOUT AURA AND WITH STATUS MIGRAINOSUS: Primary | ICD-10-CM

## 2023-09-18 NOTE — TELEPHONE ENCOUNTER
Pending Prescriptions:                       Disp   Refills    pregabalin (LYRICA) 75 MG capsule                             Sig: Take 1 capsule (75 mg) by mouth 2 times daily        Requested Pharmacy: [facility] in [place]    Pt's last office visit: 8/23/23  Next scheduled office visit: 1/31/24      Per the RN/LPN medication refill protocol, writer is unable to refill this request.

## 2023-09-19 RX ORDER — PREGABALIN 75 MG/1
75 CAPSULE ORAL 2 TIMES DAILY
Qty: 60 CAPSULE | Refills: 5 | Status: SHIPPED | OUTPATIENT
Start: 2023-09-19 | End: 2024-01-31

## 2024-01-02 ENCOUNTER — TELEPHONE (OUTPATIENT)
Dept: NEUROSURGERY | Facility: CLINIC | Age: 44
End: 2024-01-02
Payer: COMMERCIAL

## 2024-01-02 NOTE — TELEPHONE ENCOUNTER
Patient's wife, Kacey requesting 's appointment with  on 1/4/23 to be virtual. Would provider like patient to come in person or is virtual visit ok?

## 2024-01-04 ENCOUNTER — VIRTUAL VISIT (OUTPATIENT)
Dept: NEUROSURGERY | Facility: CLINIC | Age: 44
End: 2024-01-04

## 2024-01-04 DIAGNOSIS — Z98.890 S/P CRANIOTOMY: Primary | ICD-10-CM

## 2024-01-04 PROCEDURE — 99442 PR PHYSICIAN TELEPHONE EVALUATION 11-20 MIN: CPT | Mod: 93 | Performed by: NEUROLOGICAL SURGERY

## 2024-01-04 ASSESSMENT — PATIENT HEALTH QUESTIONNAIRE - PHQ9: SUM OF ALL RESPONSES TO PHQ QUESTIONS 1-9: 16
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
## 2024-01-31 ENCOUNTER — VIRTUAL VISIT (OUTPATIENT)
Dept: NEUROLOGY | Facility: CLINIC | Age: 44
End: 2024-01-31
Payer: COMMERCIAL

## 2024-01-31 DIAGNOSIS — F41.9 ANXIETY: Primary | ICD-10-CM

## 2024-01-31 DIAGNOSIS — R41.89 BRAIN FOG: ICD-10-CM

## 2024-01-31 DIAGNOSIS — G43.011 INTRACTABLE MIGRAINE WITHOUT AURA AND WITH STATUS MIGRAINOSUS: ICD-10-CM

## 2024-01-31 DIAGNOSIS — M79.2 NERVE PAIN: ICD-10-CM

## 2024-01-31 DIAGNOSIS — R42 DIZZINESS: ICD-10-CM

## 2024-01-31 PROCEDURE — 99215 OFFICE O/P EST HI 40 MIN: CPT | Mod: 95 | Performed by: STUDENT IN AN ORGANIZED HEALTH CARE EDUCATION/TRAINING PROGRAM

## 2024-01-31 RX ORDER — PREGABALIN 75 MG/1
75 CAPSULE ORAL 3 TIMES DAILY
Qty: 90 CAPSULE | Refills: 5 | Status: SHIPPED | OUTPATIENT
Start: 2024-01-31

## 2024-01-31 RX ORDER — SERTRALINE HYDROCHLORIDE 25 MG/1
25 TABLET, FILM COATED ORAL DAILY
Qty: 30 TABLET | Refills: 0 | Status: SHIPPED | OUTPATIENT
Start: 2024-01-31 | End: 2024-06-06
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
## 2024-01-31 NOTE — PROGRESS NOTES
Casey is a 43 year old who is being evaluated via a billable video visit.      How would you like to obtain your AVS? MyChart  If the video visit is dropped, the invitation should be resent by: Text to cell phone: [phone]  Will anyone else be joining your video visit? No        Video-Visit Details    Type of service:  Video Visit   Video Start Time: 8:48  Video End Time: 9:08    Originating Location (pt. Location): in [place], off site    Distant Location (provider location):  on site  Platform used for Video Visit: DAVID Cummins, [facility] ([facility])

## 2024-02-03 ENCOUNTER — MYC REFILL (OUTPATIENT)
Dept: NEUROLOGY | Facility: CLINIC | Age: 44
End: 2024-02-03
Payer: COMMERCIAL

## 2024-02-03 DIAGNOSIS — F41.9 ANXIETY: ICD-10-CM

## 2024-02-05 RX ORDER — SERTRALINE HYDROCHLORIDE 25 MG/1
25 TABLET, FILM COATED ORAL DAILY
Qty: 30 TABLET | Refills: 0 | OUTPATIENT
Start: 2024-02-05

## 2024-03-10 ENCOUNTER — HEALTH MAINTENANCE LETTER (OUTPATIENT)
Age: 44
End: 2024-03-10

## 2024-05-29 ENCOUNTER — TELEPHONE (OUTPATIENT)
Dept: NEUROLOGY | Facility: CLINIC | Age: 44
End: 2024-05-29
Payer: COMMERCIAL

## 2024-05-29 NOTE — TELEPHONE ENCOUNTER
Called and spoke with patient. He stated that the call could have come from his long term disability people. Writer let him know that the note said from him and to call him back on his personal number. Patient stated he didn't have any concerns at this time. Writer stated understanding and said to call back with any further issues. Patient stated understanding.

## 2024-05-29 NOTE — TELEPHONE ENCOUNTER
[facility] Call Center    Phone Message    May a detailed message be left on voicemail: yes     Reason for Call: Other: Pt calling to discuss work restriction concerns.    Please return pt call at [phone].    Action Taken: Message routed to:  Other: [facility]    Travel Screening: Not Applicable

## 2024-05-31 NOTE — TELEPHONE ENCOUNTER
[facility] Call Center    Phone Message    May a detailed message be left on voicemail: no    Reason for Call: Other: Maryellen from [facility], pt long term disability care, requesting a call back to discuss pt work restriction concerns.   Writer will confirm that Casey did not contact the scheduling center, but Maryellen was the one to call. Writer apologized for the confusion, and will be correcting this error.    Please return Maryellen's call at [phone].    Action Taken: Message routed to:  Other: [facility]    Travel Screening: Not Applicable

## 2024-06-03 ENCOUNTER — TELEPHONE (OUTPATIENT)
Dept: NEUROSURGERY | Facility: CLINIC | Age: 44
End: 2024-06-03
Payer: COMMERCIAL

## 2024-06-03 NOTE — TELEPHONE ENCOUNTER
[facility] Call Center    Phone Message    May a detailed message be left on voicemail: no     Reason for Call: Other: Maryellen SULTANA from [facility] called and was requesting a letter or call with what patient's work ability and/or work restrictions are. Please call back and discuss. Maryellen also requested any imaging if he has had any as well. Please call [phone] to discuss or with questions.      Action Taken: Message routed to:  Other: Neurosurgery    Travel Screening: Not Applicable     Date of Service:

## 2024-06-03 NOTE — TELEPHONE ENCOUNTER
Patient s/p  Suboccipital craniectomy and C1 laminectomy for posterior fossa decompression on 6/9/23.    No current restrictions/workability documented.   Last OV 1/4/24    Attempted to reach out to patient, no answer. Left voice message for them to call clinic back to further discuss.

## 2024-06-03 NOTE — CONFIDENTIAL NOTE
RN called the pt to discuss his situation. He states that he has improved some since his surgery but still suffers from short-term- memory issues, panic attacks, severe neck pain and when he gets a runny nose it causes debilitating headaches. I let him know that I was going to call [facility] back to discuss further and he agreed with this plan.     An attempt to reach Maryellen was unsuccessful, a message was left on her VM letting her know that we are scheduled to see Casey on Thursday and to give us a call back with further clarification on what she is looking for.    Sarah Umaña RN Care Coordinator   Neurology/Neurosurgery/PM&R/ Pain Management

## 2024-06-06 ENCOUNTER — VIRTUAL VISIT (OUTPATIENT)
Dept: NEUROLOGY | Facility: CLINIC | Age: 44
End: 2024-06-06
Payer: COMMERCIAL

## 2024-06-06 DIAGNOSIS — G43.011 INTRACTABLE MIGRAINE WITHOUT AURA AND WITH STATUS MIGRAINOSUS: ICD-10-CM

## 2024-06-06 DIAGNOSIS — R41.89 BRAIN FOG: Primary | ICD-10-CM

## 2024-06-06 DIAGNOSIS — Z98.890 S/P CRANIOTOMY: ICD-10-CM

## 2024-06-06 DIAGNOSIS — F41.9 ANXIETY: ICD-10-CM

## 2024-06-06 DIAGNOSIS — M79.2 NERVE PAIN: ICD-10-CM

## 2024-06-06 DIAGNOSIS — R41.3 MEMORY CHANGES: ICD-10-CM

## 2024-06-06 DIAGNOSIS — Q04.8 CEREBELLAR TONSILLAR ECTOPIA (H): ICD-10-CM

## 2024-06-06 PROCEDURE — G2211 COMPLEX E/M VISIT ADD ON: HCPCS | Mod: 95 | Performed by: STUDENT IN AN ORGANIZED HEALTH CARE EDUCATION/TRAINING PROGRAM

## 2024-06-06 PROCEDURE — 99214 OFFICE O/P EST MOD 30 MIN: CPT | Mod: 95 | Performed by: STUDENT IN AN ORGANIZED HEALTH CARE EDUCATION/TRAINING PROGRAM

## 2024-06-06 NOTE — PATIENT INSTRUCTIONS
Repeat neuropscyhological testing to see about changes/perhaps give confidence he could return to work.    Return after neuropsychological testing, move up follow up if get done sooner    There are several community neuropsychologists that we might suggest, including:   Dr. Gisella Gloria - [phone]   Dr. Dale Osborne - [phone]   Dr. Yeimi Castle - [phone]   Dr. Elsa Trejo - [phone]   Dr. Shikha Russo - [phone]   Dr. Kacey Mitchell-Mckeon - [phone], [email or web address]   Dr. Derick Villanueva - [phone], [email or web address]   [facility] - [phone], [email or web address]   [facility] Neurology, [email or web address]   [facility] - [phone], [email or web address]

## 2024-06-06 NOTE — PROGRESS NOTES
Casey is a 44 year old who is being evaluated via a billable video visit.    How would you like to obtain your AVS? MyChart  If the video visit is dropped, the invitation should be resent by: Text to cell phone: [phone]  Will anyone else be joining your video visit? No    Video-Visit Details    Type of service:  Video Visit   Video End Time:8:10 AM  Originating Location (pt. Location): Home    Distant Location (provider location):  On-site  Platform used for Video Visit: [employer] timing 7: AM
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
## 2024-06-19 NOTE — CONFIDENTIAL NOTE
RN spoke to Maryellen at [facility] and provided an update regarding Casey's return to work status. I explained that he just recently had a virtual visit with Dr Miller on 6/6/24 and it was determined that he will hold off on returning to work. He will repeat the neuropsych testing and return to Dr Miller to review. He does feel like things are trending in the right direction but his symptoms are still limiting him from the ability to work. She was grateful for the information and had no further questions.    Sarah Umaña RN Care Coordinator   Neurology/Neurosurgery/PM&R/ Pain Management

## 2024-07-15 ENCOUNTER — DOCUMENTATION ONLY (OUTPATIENT)
Dept: NEUROSURGERY | Facility: CLINIC | Age: 44
End: 2024-07-15
Payer: COMMERCIAL

## 2024-07-15 NOTE — PROGRESS NOTES
Faxed [employer] July 15, 2024 to fax number [phone] and HIM    Right Fax confirmed at 2:30 PM    Filippo Ford

## 2024-09-04 DIAGNOSIS — F41.9 ANXIETY: ICD-10-CM

## 2024-09-19 ENCOUNTER — TRANSFERRED RECORDS (OUTPATIENT)
Dept: HEALTH INFORMATION MANAGEMENT | Facility: CLINIC | Age: 44
End: 2024-09-19
Payer: COMMERCIAL

## 2024-10-10 ENCOUNTER — TELEPHONE (OUTPATIENT)
Dept: NEUROLOGY | Facility: CLINIC | Age: 44
End: 2024-10-10

## 2024-10-10 ENCOUNTER — VIRTUAL VISIT (OUTPATIENT)
Dept: NEUROLOGY | Facility: CLINIC | Age: 44
End: 2024-10-10
Payer: COMMERCIAL

## 2024-10-10 DIAGNOSIS — F41.9 ANXIETY: ICD-10-CM

## 2024-10-10 DIAGNOSIS — M79.2 NERVE PAIN: ICD-10-CM

## 2024-10-10 DIAGNOSIS — G43.011 INTRACTABLE MIGRAINE WITHOUT AURA AND WITH STATUS MIGRAINOSUS: ICD-10-CM

## 2024-10-10 DIAGNOSIS — R27.8 POOR MANUAL DEXTERITY: Primary | ICD-10-CM

## 2024-10-10 PROCEDURE — G2211 COMPLEX E/M VISIT ADD ON: HCPCS | Performed by: STUDENT IN AN ORGANIZED HEALTH CARE EDUCATION/TRAINING PROGRAM

## 2024-10-10 PROCEDURE — 99214 OFFICE O/P EST MOD 30 MIN: CPT | Mod: 95 | Performed by: STUDENT IN AN ORGANIZED HEALTH CARE EDUCATION/TRAINING PROGRAM

## 2024-10-10 RX ORDER — PREGABALIN 75 MG/1
75 CAPSULE ORAL 3 TIMES DAILY
Qty: 90 CAPSULE | Refills: 5 | Status: SHIPPED | OUTPATIENT
Start: 2024-10-10
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
## 2024-10-10 NOTE — TELEPHONE ENCOUNTER
Called and left message for patient to call back. Want to schedule follow up for roughly 2 weeks after neuropsych testing.    Dapsone Counseling: I discussed with the patient the risks of dapsone including but not limited to hemolytic anemia, agranulocytosis, rashes, methemoglobinemia, kidney failure, peripheral neuropathy, headaches, GI upset, and liver toxicity.  Patients who start dapsone require monitoring including baseline LFTs and weekly CBCs for the first month, then every month thereafter.  The patient verbalized understanding of the proper use and possible adverse effects of dapsone.  All of the patient's questions and concerns were addressed.

## 2024-10-10 NOTE — PROGRESS NOTES
Casey is a 44 year old who is being evaluated via a billable video visit.    How would you like to obtain your AVS? MyChart  If the video visit is dropped, the invitation should be resent by: Text to cell phone: [phone]  Will anyone else be joining your video visit? No    Video-Visit Details    Type of service:  Video Visit   Video Start/ End Time: 12:55-1:11PM  Originating Location (pt. Location):  home    Distant Location (provider location): on site    Platform used for Video Visit: DAVID Cummins, AIDAN ([facility])

## 2025-01-23 DIAGNOSIS — F41.9 ANXIETY: Primary | ICD-10-CM

## 2025-01-23 RX ORDER — ESCITALOPRAM OXALATE 5 MG/1
5 TABLET ORAL DAILY
Qty: 5 TABLET | Refills: 0 | Status: SHIPPED | OUTPATIENT
Start: 2025-01-23

## 2025-01-23 RX ORDER — BUPROPION HYDROCHLORIDE 150 MG/1
150 TABLET ORAL EVERY MORNING
Qty: 90 TABLET | Refills: 1 | Status: SHIPPED | OUTPATIENT
Start: 2025-01-23

## 2025-01-27 DIAGNOSIS — F41.9 ANXIETY: Primary | ICD-10-CM

## 2025-01-27 RX ORDER — ESCITALOPRAM OXALATE 5 MG/1
TABLET ORAL
Qty: 5 TABLET | Refills: 0 | Status: SHIPPED | OUTPATIENT
Start: 2025-01-27

## 2025-02-13 ENCOUNTER — TELEPHONE (OUTPATIENT)
Dept: NEUROPSYCHOLOGY | Facility: CLINIC | Age: 45
End: 2025-02-13
Payer: COMMERCIAL

## 2025-02-13 NOTE — TELEPHONE ENCOUNTER
Called patient as he was on waitlist with Dr. Sylvester to offer sooner appt on 2/19/ Patient said he got seen in [facility] and no longer needed the appointment. Appt cancelled per pt request.     Tiffany Trevino on 2/13/2025 at 9:27 AM

## 2025-03-12 ENCOUNTER — VIRTUAL VISIT (OUTPATIENT)
Dept: NEUROLOGY | Facility: CLINIC | Age: 45
End: 2025-03-12
Payer: COMMERCIAL

## 2025-03-12 DIAGNOSIS — G43.011 INTRACTABLE MIGRAINE WITHOUT AURA AND WITH STATUS MIGRAINOSUS: ICD-10-CM

## 2025-03-12 DIAGNOSIS — R41.89 BRAIN FOG: ICD-10-CM

## 2025-03-12 DIAGNOSIS — M79.2 NERVE PAIN: ICD-10-CM

## 2025-03-12 DIAGNOSIS — F41.9 ANXIETY: ICD-10-CM

## 2025-03-12 DIAGNOSIS — R41.3 MEMORY CHANGES: Primary | ICD-10-CM

## 2025-03-12 RX ORDER — PREGABALIN 150 MG/1
150 CAPSULE ORAL 2 TIMES DAILY
Qty: 60 CAPSULE | Refills: 5 | Status: SHIPPED | OUTPATIENT
Start: 2025-03-12

## 2025-03-12 RX ORDER — BUPROPION HYDROCHLORIDE 150 MG/1
150 TABLET ORAL EVERY MORNING
Qty: 90 TABLET | Refills: 1 | Status: SHIPPED | OUTPATIENT
Start: 2025-03-12
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
## 2025-03-12 NOTE — PROGRESS NOTES
Casey is a 45 year old who is being evaluated via a billable video visit.    How would you like to obtain your AVS? MyChart  If the video visit is dropped, the invitation should be resent by: Send to e-mail at: [email or web address]  Will anyone else be joining your video visit? No    Video-Visit Details    Video timing 936---947 AM   Used: joana Gómez location: at home  Provider location: on site    DAVID Valentin, AIDAN ([facility])      Levy Miller MD   of Neurology  [facility]

## 2025-03-16 ENCOUNTER — HEALTH MAINTENANCE LETTER (OUTPATIENT)
Age: 45
End: 2025-03-16

## (undated) DEVICE — DRSG GAUZE 4X4" 3033

## (undated) DEVICE — NDL 22GA 1.5"

## (undated) DEVICE — SOL WATER IRRIG 1000ML BOTTLE 2F7114

## (undated) DEVICE — DRSG TELFA 3X8" 1238

## (undated) DEVICE — DECANTER BAG 2002S

## (undated) DEVICE — RX SURGIFLO HEMOSTATIC MATRIX W/THROMBIN 8ML 2994

## (undated) DEVICE — BLADE KNIFE SURG 11 371111

## (undated) DEVICE — PERFORATOR 14MM CODMAN

## (undated) DEVICE — MANIFOLD NEPTUNE 4 PORT 700-20

## (undated) DEVICE — SPONGE COTTON BALL 1/4" W/STRING 30-00

## (undated) DEVICE — TUBING SUCTION SOFT 20'X3/16" 0036570

## (undated) DEVICE — SPONGE SURGIFOAM 100 1974

## (undated) DEVICE — PEN MARKING SKIN

## (undated) DEVICE — SPONGE COTTONOID 1/2X3" 80-1407

## (undated) DEVICE — LINEN TOWEL PACK X5 5464

## (undated) DEVICE — SU NUROLON 4-0 TF CR 8X18" C584D

## (undated) DEVICE — PREP DURAPREP 06ML APL 8635

## (undated) DEVICE — TOOL DISSECT MIDAS MR8 F2/7CM TAPER 2.3MM DI MR8-F2/7TA23

## (undated) DEVICE — BATTERY PACK FOR POWERED DRIVER 05.000.007.01S

## (undated) DEVICE — SU VICRYL 2-0 CT-2 CR 8X18" J726D

## (undated) DEVICE — TAPE DURAPORE 3" SILK 1538-3

## (undated) DEVICE — ESU ELEC BLADE 2.75" COATED/INSULATED E1455

## (undated) DEVICE — BLADE KNIFE SURG 15 371115

## (undated) DEVICE — GLOVE BIOGEL PI MICRO INDICATOR UNDERGLOVE SZ 8.0 48980

## (undated) DEVICE — ESU GROUND PAD UNIVERSAL W/O CORD

## (undated) DEVICE — ESU ELEC NDL 1" COATED/INSULATED E1465

## (undated) DEVICE — BLADE CLIPPER 4406

## (undated) DEVICE — PACK NEURO SNE15SNFSA

## (undated) DEVICE — STPL SKIN 35W ROTATING HEAD PRW35

## (undated) DEVICE — BONE WAX 2.5GM W31G

## (undated) DEVICE — GLOVE BIOGEL PI MICRO SZ 7.5 48575

## (undated) DEVICE — DRSG KERLIX FLUFFS X5

## (undated) DEVICE — STPL SKIN 35W 6.9MM  PXW35

## (undated) DEVICE — TOOL DISSECT MIDAS MR8 14CM MATCH HEAD 3MM MR8-14MH30

## (undated) DEVICE — SU VICRYL 0 CT-1 CR 8X18" J740D

## (undated) DEVICE — SPONGE COTTONOID 1X3" 80-1408

## (undated) DEVICE — DRSG GAUZE 4X4" 8044

## (undated) DEVICE — SPONGE RAY-TEC 4X8" 7318

## (undated) DEVICE — KIT SEALER DURASEAL EXACT SP HYDROGEL 5ML SGL USE 20-6520

## (undated) RX ORDER — DEXAMETHASONE SODIUM PHOSPHATE 4 MG/ML
INJECTION, SOLUTION INTRA-ARTICULAR; INTRALESIONAL; INTRAMUSCULAR; INTRAVENOUS; SOFT TISSUE
Status: DISPENSED
Start: 2023-06-09

## (undated) RX ORDER — PROPOFOL 10 MG/ML
INJECTION, EMULSION INTRAVENOUS
Status: DISPENSED
Start: 2023-06-09

## (undated) RX ORDER — ONDANSETRON 2 MG/ML
INJECTION INTRAMUSCULAR; INTRAVENOUS
Status: DISPENSED
Start: 2023-06-09

## (undated) RX ORDER — FENTANYL CITRATE 50 UG/ML
INJECTION, SOLUTION INTRAMUSCULAR; INTRAVENOUS
Status: DISPENSED
Start: 2023-06-09

## (undated) RX ORDER — LABETALOL HYDROCHLORIDE 5 MG/ML
INJECTION, SOLUTION INTRAVENOUS
Status: DISPENSED
Start: 2023-06-09

## (undated) RX ORDER — ESMOLOL HYDROCHLORIDE 10 MG/ML
INJECTION INTRAVENOUS
Status: DISPENSED
Start: 2023-06-09

## (undated) RX ORDER — CEFAZOLIN SODIUM/WATER 3 G/30 ML
SYRINGE (ML) INTRAVENOUS
Status: DISPENSED
Start: 2023-06-09

## (undated) RX ORDER — BUPIVACAINE HYDROCHLORIDE AND EPINEPHRINE 5; 5 MG/ML; UG/ML
INJECTION, SOLUTION EPIDURAL; INTRACAUDAL; PERINEURAL
Status: DISPENSED
Start: 2023-06-09

## (undated) RX ORDER — FENTANYL CITRATE 0.05 MG/ML
INJECTION, SOLUTION INTRAMUSCULAR; INTRAVENOUS
Status: DISPENSED
Start: 2023-06-09

## (undated) RX ORDER — HYDROMORPHONE HYDROCHLORIDE 1 MG/ML
INJECTION, SOLUTION INTRAMUSCULAR; INTRAVENOUS; SUBCUTANEOUS
Status: DISPENSED
Start: 2023-06-09

## (undated) RX ORDER — VECURONIUM BROMIDE 1 MG/ML
INJECTION, POWDER, LYOPHILIZED, FOR SOLUTION INTRAVENOUS
Status: DISPENSED
Start: 2023-06-09

## (undated) RX ORDER — HYDROMORPHONE HCL IN WATER/PF 6 MG/30 ML
PATIENT CONTROLLED ANALGESIA SYRINGE INTRAVENOUS
Status: DISPENSED
Start: 2023-06-09

## (undated) RX ORDER — HYDRALAZINE HYDROCHLORIDE 20 MG/ML
INJECTION INTRAMUSCULAR; INTRAVENOUS
Status: DISPENSED
Start: 2023-06-09

## (undated) RX ORDER — GINSENG 100 MG
CAPSULE ORAL
Status: DISPENSED
Start: 2023-06-09